# Patient Record
Sex: MALE | Race: WHITE | Employment: OTHER | URBAN - METROPOLITAN AREA
[De-identification: names, ages, dates, MRNs, and addresses within clinical notes are randomized per-mention and may not be internally consistent; named-entity substitution may affect disease eponyms.]

---

## 2017-07-07 ENCOUNTER — OFFICE VISIT (OUTPATIENT)
Dept: NEUROLOGY | Age: 72
End: 2017-07-07

## 2017-07-07 VITALS
SYSTOLIC BLOOD PRESSURE: 156 MMHG | HEIGHT: 64 IN | HEART RATE: 71 BPM | TEMPERATURE: 97.8 F | WEIGHT: 164 LBS | BODY MASS INDEX: 28 KG/M2 | DIASTOLIC BLOOD PRESSURE: 80 MMHG | OXYGEN SATURATION: 97 % | RESPIRATION RATE: 18 BRPM

## 2017-07-07 DIAGNOSIS — G51.0 LEFT-SIDED BELL'S PALSY: Primary | ICD-10-CM

## 2017-07-07 DIAGNOSIS — G25.2 TREMOR, COARSE: ICD-10-CM

## 2017-07-07 DIAGNOSIS — H04.129 EYE DRYNESS: ICD-10-CM

## 2017-07-07 RX ORDER — ERYTHROMYCIN 5 MG/G
OINTMENT OPHTHALMIC
Qty: 1 G | Refills: 1 | Status: SHIPPED | OUTPATIENT
Start: 2017-07-07

## 2017-07-07 RX ORDER — ACYCLOVIR 800 MG/1
800 TABLET ORAL 2 TIMES DAILY
COMMUNITY

## 2017-07-07 RX ORDER — ATORVASTATIN CALCIUM 40 MG/1
TABLET, FILM COATED ORAL DAILY
COMMUNITY

## 2017-07-07 RX ORDER — VALSARTAN 320 MG/1
TABLET ORAL DAILY
COMMUNITY

## 2017-07-07 RX ORDER — METOPROLOL SUCCINATE 25 MG/1
TABLET, EXTENDED RELEASE ORAL DAILY
COMMUNITY

## 2017-07-07 RX ORDER — PREDNISONE 20 MG/1
TABLET ORAL
COMMUNITY
End: 2017-07-07 | Stop reason: SDUPTHER

## 2017-07-07 RX ORDER — HYDROCHLOROTHIAZIDE 12.5 MG/1
12.5 TABLET ORAL DAILY
COMMUNITY

## 2017-07-07 RX ORDER — PREDNISONE 20 MG/1
TABLET ORAL
Qty: 18 TAB | Refills: 0 | Status: SHIPPED | OUTPATIENT
Start: 2017-07-07 | End: 2017-10-10 | Stop reason: ALTCHOICE

## 2017-07-07 NOTE — COMMUNICATION BODY
Chief Complaint   Patient presents with    Facial Droop       Referred by: Dr. Lauri Aase      HPI    Mr. Kaitlin Shrestha is a 69-year-old gentleman with a history of hypertension here for facial weakness. Approximately 8 or 9 days ago he woke up with left facial weakness and asymmetry. He went to urgent care/ED and was diagnosed with Bell's palsy. He had a head CT by report which was normal.  Since then he was given a course of steroids for which he has been taking 60 mg daily for 5 days about to stop tomorrow. He was given a course of acyclovir which he has not begun yet. He has weakness of the left side of his face is that he cannot close his eye. He has a hard time eating and drinking out of the left side of his mouth. He denies any focal weakness or numbness in any of the extremities. No headache. No change in mentation. No prior immunizations or acute illnesses. He also complaining of tremor now for several years more in the left hand. No constipation or drooling other than described due to Bell's. Review of Systems   Neurological: Positive for tremors. Left facial weakness   All other systems reviewed and are negative. Past Medical History:   Diagnosis Date    Anemia     Cancer ([facility] [address]     Cancer of ascending colon ([facility] [address] 9/17/2010    Carcinoma in situ of colon     Chronic cholecystitis     Ringing in ears     SURGERY FOLLOWUP NOS 9/17/2010    Wears glasses      Family History   Problem Relation Age of Onset    Heart Failure Mother     Heart Attack Father      Social History     Social History    Marital status:      Spouse name: N/A    Number of children: N/A    Years of education: N/A     Occupational History    Not on file.      Social History Main Topics    Smoking status: Current Every Day Smoker    Smokeless tobacco: Never Used    Alcohol use Yes    Drug use: Not on file    Sexual activity: Not on file     Other Topics Concern    Not on file Social History Narrative     Current Outpatient Prescriptions   Medication Sig    atorvastatin (LIPITOR) 40 mg tablet Take  by mouth daily.  valsartan (DIOVAN) 320 mg tablet Take  by mouth daily.  hydroCHLOROthiazide (HYDRODIURIL) 12.5 mg tablet Take 12.5 mg by mouth daily.  acyclovir (ZOVIRAX) 800 mg tablet Take 800 mg by mouth two (2) times a day.  metoprolol succinate (TOPROL-XL) 25 mg XL tablet Take  by mouth daily.  erythromycin (ILOTYCIN) ophthalmic ointment Apply 1 ribbon of gel underneath the eyelid up to 4 times daily for lubrication.  predniSONE (DELTASONE) 20 mg tablet Take 2 tabs daily for 5 days then 1 tab daily for 5 days and half tab daily for 5 days then stop    clonidine (CATAPRES) 0.1 mg tablet      No current facility-administered medications for this visit. No Known Allergies      Neurologic Exam     Mental Status   Oriented to person, place, and time. Attention: normal.   Speech: speech is normal   Level of consciousness: alert    Cranial Nerves   Cranial nerves II through XII intact. CN VII   Left facial weakness: peripheral (Injected left eye)    Motor Exam   Muscle bulk: normal  Overall muscle tone: normal    Strength   Strength 5/5 throughout. Sensory Exam   Light touch normal.     Gait, Coordination, and Reflexes     Gait  Gait: normal    Tremor   Resting tremor: Bilateral mild resting tremor more on the left that is amplified with posture. Physical Exam   Constitutional: He is oriented to person, place, and time. He appears well-developed and well-nourished. Cardiovascular: Normal rate. Pulmonary/Chest: Effort normal.   Neurological: He is oriented to person, place, and time. He has normal strength. Gait normal.   Skin: Skin is warm and dry. Psychiatric: He has a normal mood and affect. His speech is normal and behavior is normal.   Vitals reviewed.     Visit Vitals    /80    Pulse 71    Temp 97.8 °F (36.6 °C)    Resp 18    Ht 5' 4\" (1.626 m)    Wt 74.4 kg (164 lb)    SpO2 97%    BMI 28.15 kg/m2       Lab Results  Component Value Date/Time   WBC 12.2 09/04/2010 06:20 AM   HGB 7.8 09/04/2010 06:20 AM   HCT 25.6 09/04/2010 06:20 AM   PLATELET 941 82/62/9710 06:20 AM   MCV 67.7 09/04/2010 06:20 AM     Lab Results  Component Value Date/Time   Hemoglobin A1c 5.9 08/28/2010 11:19 AM   Glucose 115 09/04/2010 06:20 AM   LDL, calculated 106 08/29/2010 05:45 AM   Creatinine 1.2 09/04/2010 06:20 AM      Lab Results  Component Value Date/Time   Cholesterol, total 178 08/29/2010 05:45 AM   HDL Cholesterol 33 08/29/2010 05:45 AM   LDL, calculated 106 08/29/2010 05:45 AM   Triglyceride 195 08/29/2010 05:45 AM   CHOL/HDL Ratio 5.4 08/29/2010 05:45 AM              CT Results (maximum last 3): Results from [place] encounter on 09/21/10   CT PELVIS WITH CONTRAST   Narrative Final Report      ICD Codes / Adm. Diagnosis:    /   COLON CANCER  Examination:  PELVIS W CON  - [id number] - Sep 21 2010  1:28PM  Accession No:  [id number]  Reason:  COLON CANCER      REPORT:  Multiple axial images were performed through the abdomen with and without IV   contrast and through the pelvis with IV contrast. 95 mL of Optiray-350 was   administered intravenously. No focal lesions are identified in the liver,   spleen pancreas or kidneys. Right colectomy changes are noted. No adenopathy   is identified. Images in the pelvis demonstrates no mass lesions. There is   slight haziness around umbilicus most likely postoperative. IMPRESSION:  1. No focal lesions are identified in the liver, spleen pancreas or kidneys. 2. Postoperative changes are noted from right colectomy. 3. No adenopathy is identified. No pelvic masses are identified.           Interpreting/Reading Doctor: Rashmi Mills ([id number])  Transcribed: n/a on 09/21/2010  Approved: Rashmi Mills ([id number])  09/21/2010          Distribution:  Attending Doctor: Julissa Osborne Doctor: Ama Pope CLARKE          CT ABDOMEN W AND W/O CONTRAST   Narrative Final Report      ICD Codes / Adm. Diagnosis:    /   COLON CANCER  Examination:  ABDOMEN W AND WO CON  - [id number] - Sep 21 2010  1:28PM  Accession No:  [id number]  Reason:  COLON CANCER      REPORT:  Multiple axial images were performed through the abdomen with and without IV   contrast and through the pelvis with IV contrast. 95 mL of Optiray-350 was   administered intravenously. No focal lesions are identified in the liver,   spleen pancreas or kidneys. Right colectomy changes are noted. No adenopathy   is identified. Images in the pelvis demonstrates no mass lesions. There is   slight haziness around umbilicus most likely postoperative. IMPRESSION:  1. No focal lesions are identified in the liver, spleen pancreas or kidneys. 2. Postoperative changes are noted from right colectomy. 3. No adenopathy is identified. No pelvic masses are identified. Interpreting/Reading Doctor: Lars Alcaraz ([id number])  Transcribed: n/a on 09/21/2010  Approved: Lars Alcaraz ([id number])  09/21/2010          Distribution:  Attending Doctor: Ed Coleman  Alternate Doctor: Ed Coleman            MRI Results (maximum last 3): No results found for this or any previous visit. PET Results (maximum last 3): No results found for this or any previous visit. Assessment and Plan   Devin Hawthorne was seen today for facial droop. Diagnoses and all orders for this visit:    Left-sided Bell's palsy    Eye dryness    Tremor, coarse    Other orders  -     erythromycin (ILOTYCIN) ophthalmic ointment; Apply 1 ribbon of gel underneath the eyelid up to 4 times daily for lubrication. -     predniSONE (DELTASONE) 20 mg tablet;  Take 2 tabs daily for 5 days then 1 tab daily for 5 days and half tab daily for 5 days then stop      78-year-old gentleman with left Bell's palsy I discussed with him that 80% of people have complete recovery but there is a small population inwhich there may be residual deficit. Unclear how much he will recover at this point. I am going to prolong his steroid taper. To include 40 then 20 than 10 mg dosing. I advised him to not take acyclovir as there is no role for antivirals in this condition. I discussed with him the most serious complication of this is corneal abrasions which could lead to permanent vision loss. I am giving him erythromycin ophthalmic ointment to use as needed. He should use an eye patch or tape the eye at bedtime so it is closed. Additionally, he is having tremor more on the left hand but present in both hands. This looks benign. No rigidity on exam.  No telltale signs for parkinsonism being reported. I would watch for now. He is about to start metoprolol for hypertension which could help the tremor slightly. I would like to see him in 3 months for the tremor. A notice of this visit/encounter being completed has been sent electronically to the patient's PCP and/or referring provider.      [address] [facility], [facility] [address]  Diplomate MIGDALIA

## 2017-07-07 NOTE — LETTER
7/7/2017 Patient:  Nano Chen YOB: 1945 Date of Visit: 7/7/2017 Dear Saira Knowles MD 
[facility] [address] [facility] [phone] [id number] VIA Facsimile: [phone] 
 : 
 
 
I was requested by Sugar Armas MD to evaluate Mr. Tee Neville  for Chief Complaint Patient presents with  Facial Droop Michaelyn Daily I am recommending the following:  
 
Douglas Whalen was seen today for facial droop. Diagnoses and all orders for this visit: 
 
Left-sided Bell's palsy Eye dryness Tremor, coarse Other orders 
-     erythromycin (ILOTYCIN) ophthalmic ointment; Apply 1 ribbon of gel underneath the eyelid up to 4 times daily for lubrication. -     predniSONE (DELTASONE) 20 mg tablet; Take 2 tabs daily for 5 days then 1 tab daily for 5 days and half tab daily for 5 days then stop 
 
 
 
---------------------------------------------------------------------------------------------------------------------- Below is my encounter: Chief Complaint Patient presents with  Facial Droop Referred by: Dr. Caroline Bolden HPI Mr. Justine Wu is a 78-year-old gentleman with a history of hypertension here for facial weakness. Approximately 8 or 9 days ago he woke up with left facial weakness and asymmetry. He went to urgent care/ED and was diagnosed with Bell's palsy. He had a head CT by report which was normal.  Since then he was given a course of steroids for which he has been taking 60 mg daily for 5 days about to stop tomorrow. He was given a course of acyclovir which he has not begun yet. He has weakness of the left side of his face is that he cannot close his eye. He has a hard time eating and drinking out of the left side of his mouth. He denies any focal weakness or numbness in any of the extremities. No headache. No change in mentation. No prior immunizations or acute illnesses. He also complaining of tremor now for several years more in the left hand. No constipation or drooling other than described due to Bell's. Review of Systems Neurological: Positive for tremors. Left facial weakness All other systems reviewed and are negative. Past Medical History:  
Diagnosis Date  Anemia  Cancer ([facility] [address]  Cancer of ascending colon ([facility] [address] 9/17/2010  Carcinoma in situ of colon  Chronic cholecystitis  Ringing in ears  SURGERY FOLLOWUP NOS 9/17/2010  Wears glasses Family History Problem Relation Age of Onset  Heart Failure Mother  Heart Attack Father Social History Social History  Marital status:  Spouse name: N/A  
 Number of children: N/A  
 Years of education: N/A Occupational History  Not on file. Social History Main Topics  Smoking status: Current Every Day Smoker  Smokeless tobacco: Never Used  Alcohol use Yes  Drug use: Not on file  Sexual activity: Not on file Other Topics Concern  Not on file Social History Narrative Current Outpatient Prescriptions Medication Sig  
 atorvastatin (LIPITOR) 40 mg tablet Take  by mouth daily.  valsartan (DIOVAN) 320 mg tablet Take  by mouth daily.  hydroCHLOROthiazide (HYDRODIURIL) 12.5 mg tablet Take 12.5 mg by mouth daily.  acyclovir (ZOVIRAX) 800 mg tablet Take 800 mg by mouth two (2) times a day.  metoprolol succinate (TOPROL-XL) 25 mg XL tablet Take  by mouth daily.  erythromycin (ILOTYCIN) ophthalmic ointment Apply 1 ribbon of gel underneath the eyelid up to 4 times daily for lubrication.  predniSONE (DELTASONE) 20 mg tablet Take 2 tabs daily for 5 days then 1 tab daily for 5 days and half tab daily for 5 days then stop  clonidine (CATAPRES) 0.1 mg tablet No current facility-administered medications for this visit. No Known Allergies Neurologic Exam  
 
Mental Status Oriented to person, place, and time. Attention: normal.  
Speech: speech is normal  
Level of consciousness: alert Cranial Nerves Cranial nerves II through XII intact. CN VII Left facial weakness: peripheral (Injected left eye) Motor Exam  
Muscle bulk: normal 
Overall muscle tone: normal 
 
Strength Strength 5/5 throughout. Sensory Exam  
Light touch normal.  
 
Gait, Coordination, and Reflexes Gait Gait: normal 
 
Tremor Resting tremor: Bilateral mild resting tremor more on the left that is amplified with posture. Physical Exam  
Constitutional: He is oriented to person, place, and time. He appears well-developed and well-nourished. Cardiovascular: Normal rate. Pulmonary/Chest: Effort normal.  
Neurological: He is oriented to person, place, and time. He has normal strength. Gait normal.  
Skin: Skin is warm and dry. Psychiatric: He has a normal mood and affect. His speech is normal and behavior is normal.  
Vitals reviewed. Visit Vitals  /80  Pulse 71  Temp 97.8 °F (36.6 °C)  Resp 18  Ht 5' 4\" (1.626 m)  Wt 74.4 kg (164 lb)  SpO2 97%  BMI 28.15 kg/m2 Lab Results Component Value Date/Time WBC 12.2 09/04/2010 06:20 AM  
HGB 7.8 09/04/2010 06:20 AM  
HCT 25.6 09/04/2010 06:20 AM  
PLATELET 915 54/17/3205 06:20 AM  
MCV 67.7 09/04/2010 06:20 AM  
 
Lab Results Component Value Date/Time Hemoglobin A1c 5.9 08/28/2010 11:19 AM  
Glucose 115 09/04/2010 06:20 AM  
LDL, calculated 106 08/29/2010 05:45 AM  
Creatinine 1.2 09/04/2010 06:20 AM  
  
Lab Results Component Value Date/Time Cholesterol, total 178 08/29/2010 05:45 AM  
HDL Cholesterol 33 08/29/2010 05:45 AM  
LDL, calculated 106 08/29/2010 05:45 AM  
Triglyceride 195 08/29/2010 05:45 AM  
CHOL/HDL Ratio 5.4 08/29/2010 05:45 AM  
 
 
 
  
 
[facility] (maximum last 3): [facility] PEL[facility] WITH CONTRAST Narrative Final Report ICD Codes / Adm. Diagnosis:    /   COLON CANCER Examination:  PELVIS W CON  - [id number] - Sep 21 2010  1:28PM 
Accession No:  [id number] Reason:  COLON CANCER 
 
 
REPORT: 
Multiple axial images were performed through the abdomen with and without IV  
contrast and through the pelvis with IV contrast. 95 mL of Optiray-350 was  
administered intravenously. No focal lesions are identified in the liver,  
spleen pancreas or kidneys. Right colectomy changes are noted. No adenopathy  
is identified. Images in the pelvis demonstrates no mass lesions. There is  
slight haziness around umbilicus most likely postoperative. IMPRESSION: 
1. No focal lesions are identified in the liver, spleen pancreas or kidneys. 2. Postoperative changes are noted from right colectomy. 3. No adenopathy is identified. No pelvic masses are identified. Interpreting/Reading Doctor: Brent Daigle ([id number]) Transcribed: n/a on 09/21/2010 [facility] ([id number])  09/21/2010 Distribution:  Attending Doctor: Nate Crane Alternate Doctor: Nate Crane 
 
   
 
CT ABDOMEN W AND W/O CONTRAST Narrative Final Report ICD Codes / Adm. Diagnosis:    /   COLON CANCER Examination:  ABDOMEN W AND WO CON  - [id number] - Sep 21 2010  1:28PM 
Accession No:  [id number] Reason:  COLON CANCER 
 
 
REPORT: 
Multiple axial images were performed through the abdomen with and without IV  
contrast and through the pelvis with IV contrast. 95 mL of Optiray-350 was  
administered intravenously. No focal lesions are identified in the liver,  
spleen pancreas or kidneys. Right colectomy changes are noted. No adenopathy  
is identified. Images in the pelvis demonstrates no mass lesions. There is  
slight haziness around umbilicus most likely postoperative. IMPRESSION: 
1. No focal lesions are identified in the liver, spleen pancreas or kidneys. 2. Postoperative changes are noted from right colectomy. 3. No adenopathy is identified. No pelvic masses are identified. Interpreting/Reading Doctor: Rashmi Mills ([id number]) Transcribed: n/a on 09/21/2010 Danitza Henning ([id number])  09/21/2010 Distribution:  Attending Doctor: Mike Luis Alternate Doctor: Mike Luis Assessment and Plan Left-sided Bell's palsy Eye dryness Tremor, coarse Other orders 
-     erythromycin (ILOTYCIN) ophthalmic ointment; Apply 1 ribbon of gel underneath the eyelid up to 4 times daily for lubrication. -     predniSONE (DELTASONE) 20 mg tablet; Take 2 tabs daily for 5 days then 1 tab daily for 5 days and half tab daily for 5 days then stop 68-year-old gentleman with left Bell's palsy I discussed with him that 80% of people have complete recovery but there is a small population inwhich there may be residual deficit. Unclear how much he will recover at this point. I am going to prolong his steroid taper. To include 40 then 20 than 10 mg dosing. I advised him to not take acyclovir as there is no role for antivirals in this condition. I discussed with him the most serious complication of this is corneal abrasions which could lead to permanent vision loss. I am giving him erythromycin ophthalmic ointment to use as needed. He should use an eye patch or tape the eye at bedtime so it is closed. Additionally, he is having tremor more on the left hand but present in both hands. This looks benign. No rigidity on exam.  No telltale signs for parkinsonism being reported. I would watch for now. He is about to start metoprolol for hypertension which could help the tremor slightly. I would like to see him in 3 months for the tremor. Thank you for giving me the opportunity to assist in the care of Mr. Byron Butler. If you have questions, please do not hesitate to contact me. Sincerely, [address] [facility], DO Neurologist 
Diplomate ABPN

## 2017-07-07 NOTE — MR AVS SNAPSHOT
Visit Information Date & Time Provider Department Dept. Phone Encounter #  
 7/7/2017  1:00  [facility], [address] Neurology Clinic at [address] [id number] Follow-up Instructions Return in about 3 months (around 10/7/2017). Upcoming Health Maintenance Date Due DTaP/Tdap/Td series (1 - Tdap) 11/22/1965 FOBT Q 1 YEAR AGE 50-75 11/22/1994 ZOSTER VACCINE AGE 60> 11/22/2004 GLAUCOMA SCREENING Q2Y 11/22/2009 Pneumococcal 65+ High/Highest Risk (1 of 2 - PCV13) 11/22/2009 MEDICARE YEARLY EXAM 11/22/2009 INFLUENZA AGE 9 TO ADULT 8/1/2017 Allergies as of 7/7/2017  Review Complete On: 7/7/2017 By: Gely Whitlock LPN No Known Allergies Current Immunizations  Never Reviewed No immunizations on file. Not reviewed this visit You Were Diagnosed With   
  
 Codes Comments Left-sided Bell's palsy    -  Primary ICD-10-CM: G51.0 ICD-9-CM: 351.0 Eye dryness     ICD-10-CM: L16.145 ICD-9-CM: 375.15 Tremor, coarse     ICD-10-CM: G25.2 ICD-9-CM: 529. 0 Vitals BP Pulse Temp Resp Height(growth percentile) Weight(growth percentile) 156/80 71 97.8 °F (36.6 °C) 18 5' 4\" (1.626 m) 164 lb (74.4 kg) SpO2 BMI Smoking Status 97% 28.15 kg/m2 Current Every Day Smoker BMI and BSA Data Body Mass Index Body Surface Area  
 28.15 kg/m 2 1.83 m 2 Your Updated Medication List  
  
   
This list is accurate as of: 7/7/17  1:49 PM.  Always use your most recent med list.  
  
  
  
  
 acyclovir 800 mg tablet Commonly known as:  ZOVIRAX Take 800 mg by mouth two (2) times a day. atorvastatin 40 mg tablet Commonly known as:  LIPITOR Take  by mouth daily. cloNIDine HCl 0.1 mg tablet Commonly known as:  CATAPRES  
  
 erythromycin ophthalmic ointment Commonly known as:  ILOTYCIN Apply 1 ribbon of gel underneath the eyelid up to 4 times daily for lubrication. hydroCHLOROthiazide 12.5 mg tablet Commonly known as:  HYDRODIURIL Take 12.5 mg by mouth daily. metoprolol succinate 25 mg XL tablet Commonly known as:  TOPROL-XL Take  by mouth daily. predniSONE 20 mg tablet Commonly known as:  Zeus Smaller Take 2 tabs daily for 5 days then 1 tab daily for 5 days and half tab daily for 5 days then stop  
  
 valsartan 320 mg tablet Commonly known as:  DIOVAN Take  by mouth daily. Prescriptions Printed Refills  
 erythromycin (ILOTYCIN) ophthalmic ointment 1 Sig: Apply 1 ribbon of gel underneath the eyelid up to 4 times daily for lubrication. Class: Print  
 predniSONE (DELTASONE) 20 mg tablet 0 Sig: Take 2 tabs daily for 5 days then 1 tab daily for 5 days and half tab daily for 5 days then stop Class: Print Follow-up Instructions Return in about 3 months (around 10/7/2017). Patient Instructions PRESCRIPTION REFILL POLICY [facility] Statement to Patients April 1, 2014 In an effort to ensure the large volume of patient prescription refills is processed in the most efficient and expeditious manner, we are asking our patients to assist us by calling your Pharmacy for all prescription refills, this will include also your  Mail Order Pharmacy. The pharmacy will contact our office electronically to continue the refill process. Please do not wait until the last minute to call your pharmacy. We need at least 48 hours (2days) to fill prescriptions. We also encourage you to call your pharmacy before going to  your prescription to make sure it is ready. With regard to controlled substance prescription refill requests (narcotic refills) that need to be picked up at our office, we ask your cooperation by providing us with at least 72 hours (3days) notice that you will need a refill.  
 
We will not refill narcotic prescription refill requests after 4:00pm on any weekday, Monday through Thursday, or after 2:00pm on Fridays, or on the weekends. We encourage everyone to explore another way of getting your prescription refill request processed using [employer], our patient web portal through our electronic medical record system. [employer] is an efficient and effective way to communicate your medication request directly to the office and  downloadable as an wolf on your smart phone . [employer] also features a review functionality that allows you to view your medication list as well as leave messages for your physician. Are you ready to get connected? If so please review the attatched instructions or speak to any of our staff to get you set up right away! Thank you so much for your cooperation. Should you have any questions please contact our Practice Administrator. The Physicians and Staff,  [facility] Thank you for choosing [facility] for your  
 
care. You may receive a survey about your visit. We appreciate you taking time  
 
to complete this survey as we use your feedback to improve our services. We  
 
realize we are not perfect, but we strive to provide excellent care. Introducing [facility] & HEALTH SERVICES! [facility] introduces [employer] patient portal. Now you can access parts of your medical record, email your doctor's office, and request medication refills online. 1. In your internet browser, go to [email or web address] 2. Click on the First Time User? Click Here link in the Sign In box. You will see the New Member Sign Up page. 3. Enter your [employer] Access Code exactly as it appears below. You will not need to use this code after youve completed the sign-up process. If you do not sign up before the expiration date, you must request a new code. · [employer] Access Code: [email or web address] Expires: 10/5/2017  1:18 PM 
 
 4. Enter the last four digits of your Social Security Number (xxxx) and Date of Birth (mm/dd/yyyy) as indicated and click Submit. You will be taken to the next sign-up page. 5. Create a Genlot ID. This will be your [employer] login ID and cannot be changed, so think of one that is secure and easy to remember. 6. Create a [employer] password. You can change your password at any time. 7. Enter your Password Reset Question and Answer. This can be used at a later time if you forget your password. 8. Enter your e-mail address. You will receive e-mail notification when new information is available in [address] 9. Click Sign Up. You can now view and download portions of your medical record. 10. Click the Download Summary menu link to download a portable copy of your medical information. If you have questions, please visit the Frequently Asked Questions section of the [employer] website. Remember, [employer] is NOT to be used for urgent needs. For medical emergencies, dial 911. Now available from your iPhone and Android! Please provide this summary of care documentation to your next provider. Your primary care clinician is listed as BARRON QUICK. If you have any questions after today's visit, please call [phone].

## 2017-07-07 NOTE — PATIENT INSTRUCTIONS
[facility]   Statement to Patients  April 1, 2014      In an effort to ensure the large volume of patient prescription refills is processed in the most efficient and expeditious manner, we are asking our patients to assist us by calling your Pharmacy for all prescription refills, this will include also your  Mail Order Pharmacy. The pharmacy will contact our office electronically to continue the refill process. Please do not wait until the last minute to call your pharmacy. We need at least 48 hours (2days) to fill prescriptions. We also encourage you to call your pharmacy before going to  your prescription to make sure it is ready. With regard to controlled substance prescription refill requests (narcotic refills) that need to be picked up at our office, we ask your cooperation by providing us with at least 72 hours (3days) notice that you will need a refill. We will not refill narcotic prescription refill requests after 4:00pm on any weekday, Monday through Thursday, or after 2:00pm on Fridays, or on the weekends. We encourage everyone to explore another way of getting your prescription refill request processed using [employer], our patient web portal through our electronic medical record system. [employer] is an efficient and effective way to communicate your medication request directly to the office and  downloadable as an wolf on your smart phone . [employer] also features a review functionality that allows you to view your medication list as well as leave messages for your physician. Are you ready to get connected? If so please review the attatched instructions or speak to any of our staff to get you set up right away! Thank you so much for your cooperation. Should you have any questions please contact our Practice Administrator.     The Physicians and Staff,  [facility]     Thank you for choosing [facility] and [facility] for your     care. You may receive a survey about your visit. We appreciate you taking time     to complete this survey as we use your feedback to improve our services. We     realize we are not perfect, but we strive to provide excellent care.

## 2017-07-07 NOTE — PROGRESS NOTES
Chief Complaint   Patient presents with    Facial Droop       Referred by: Dr. Monica Mccormack      HPI    Mr. Rusty Monique is a 59-year-old gentleman with a history of hypertension here for facial weakness. Approximately 8 or 9 days ago he woke up with left facial weakness and asymmetry. He went to urgent care/ED and was diagnosed with Bell's palsy. He had a head CT by report which was normal.  Since then he was given a course of steroids for which he has been taking 60 mg daily for 5 days about to stop tomorrow. He was given a course of acyclovir which he has not begun yet. He has weakness of the left side of his face is that he cannot close his eye. He has a hard time eating and drinking out of the left side of his mouth. He denies any focal weakness or numbness in any of the extremities. No headache. No change in mentation. No prior immunizations or acute illnesses. He also complaining of tremor now for several years more in the left hand. No constipation or drooling other than described due to Bell's. Review of Systems   Neurological: Positive for tremors. Left facial weakness   All other systems reviewed and are negative. Past Medical History:   Diagnosis Date    Anemia     [facility]     Cancer of ascending colon ([facility] 9/17/2010    Carcinoma in situ of colon     Chronic cholecystitis     Ringing in ears     SURGERY FOLLOWUP NOS 9/17/2010    Wears glasses      Family History   Problem Relation Age of Onset    Heart Failure Mother     Heart Attack Father      Social History     Social History    Marital status:      Spouse name: N/A    Number of children: N/A    Years of education: N/A     Occupational History    Not on file.      Social History Main Topics    Smoking status: Current Every Day Smoker    Smokeless tobacco: Never Used    Alcohol use Yes    Drug use: Not on file    Sexual activity: Not on file     Other Topics Concern    Not on file Social History Narrative     Current Outpatient Prescriptions   Medication Sig    atorvastatin (LIPITOR) 40 mg tablet Take  by mouth daily.  valsartan (DIOVAN) 320 mg tablet Take  by mouth daily.  hydroCHLOROthiazide (HYDRODIURIL) 12.5 mg tablet Take 12.5 mg by mouth daily.  acyclovir (ZOVIRAX) 800 mg tablet Take 800 mg by mouth two (2) times a day.  metoprolol succinate (TOPROL-XL) 25 mg XL tablet Take  by mouth daily.  erythromycin (ILOTYCIN) ophthalmic ointment Apply 1 ribbon of gel underneath the eyelid up to 4 times daily for lubrication.  predniSONE (DELTASONE) 20 mg tablet Take 2 tabs daily for 5 days then 1 tab daily for 5 days and half tab daily for 5 days then stop    clonidine (CATAPRES) 0.1 mg tablet      No current facility-administered medications for this visit. No Known Allergies      Neurologic Exam     Mental Status   Oriented to person, place, and time. Attention: normal.   Speech: speech is normal   Level of consciousness: alert    Cranial Nerves   Cranial nerves II through XII intact. CN VII   Left facial weakness: peripheral (Injected left eye)    Motor Exam   Muscle bulk: normal  Overall muscle tone: normal    Strength   Strength 5/5 throughout. Sensory Exam   Light touch normal.     Gait, Coordination, and Reflexes     Gait  Gait: normal    Tremor   Resting tremor: Bilateral mild resting tremor more on the left that is amplified with posture. Physical Exam   Constitutional: He is oriented to person, place, and time. He appears well-developed and well-nourished. Cardiovascular: Normal rate. Pulmonary/Chest: Effort normal.   Neurological: He is oriented to person, place, and time. He has normal strength. Gait normal.   Skin: Skin is warm and dry. Psychiatric: He has a normal mood and affect. His speech is normal and behavior is normal.   Vitals reviewed.     Visit Vitals    /80    Pulse 71    Temp 97.8 °F (36.6 °C)    Resp 18    Ht 5' 4\" (1.626 m)    Wt 74.4 kg (164 lb)    SpO2 97%    BMI 28.15 kg/m2       Lab Results  Component Value Date/Time   WBC 12.2 09/04/2010 06:20 AM   HGB 7.8 09/04/2010 06:20 AM   HCT 25.6 09/04/2010 06:20 AM   PLATELET 117 04/32/3593 06:20 AM   MCV 67.7 09/04/2010 06:20 AM     Lab Results  Component Value Date/Time   Hemoglobin A1c 5.9 08/28/2010 11:19 AM   Glucose 115 09/04/2010 06:20 AM   LDL, calculated 106 08/29/2010 05:45 AM   Creatinine 1.2 09/04/2010 06:20 AM      Lab Results  Component Value Date/Time   Cholesterol, total 178 08/29/2010 05:45 AM   HDL Cholesterol 33 08/29/2010 05:45 AM   LDL, calculated 106 08/29/2010 05:45 AM   Triglyceride 195 08/29/2010 05:45 AM   CHOL/HDL Ratio 5.4 08/29/2010 05:45 AM              CT Results (maximum last 3): Results from [place] encounter on 09/21/10   CT PELVIS WITH CONTRAST   Narrative Final Report      ICD Codes / Adm. Diagnosis:    /   COLON CANCER  Examination:  PELVIS W CON  - [id number] - Sep 21 2010  1:28PM  Accession No:  [id number]  Reason:  COLON CANCER      REPORT:  Multiple axial images were performed through the abdomen with and without IV   contrast and through the pelvis with IV contrast. 95 mL of Optiray-350 was   administered intravenously. No focal lesions are identified in the liver,   spleen pancreas or kidneys. Right colectomy changes are noted. No adenopathy   is identified. Images in the pelvis demonstrates no mass lesions. There is   slight haziness around umbilicus most likely postoperative. IMPRESSION:  1. No focal lesions are identified in the liver, spleen pancreas or kidneys. 2. Postoperative changes are noted from right colectomy. 3. No adenopathy is identified. No pelvic masses are identified.           Interpreting/Reading Doctor: Leone Leyden ([id number])  Transcribed: n/a on 09/21/2010  Approved: Leone Leyden ([id number])  09/21/2010          Distribution:  Attending Doctor: Nav Arshad Doctor: Lidia Villasenor CLARKE          CT ABDOMEN W AND W/O CONTRAST   Narrative Final Report      ICD Codes / Adm. Diagnosis:    /   COLON CANCER  Examination:  ABDOMEN W AND WO CON  - [id number] - Sep 21 2010  1:28PM  Accession No:  [id number]  Reason:  COLON CANCER      REPORT:  Multiple axial images were performed through the abdomen with and without IV   contrast and through the pelvis with IV contrast. 95 mL of Optiray-350 was   administered intravenously. No focal lesions are identified in the liver,   spleen pancreas or kidneys. Right colectomy changes are noted. No adenopathy   is identified. Images in the pelvis demonstrates no mass lesions. There is   slight haziness around umbilicus most likely postoperative. IMPRESSION:  1. No focal lesions are identified in the liver, spleen pancreas or kidneys. 2. Postoperative changes are noted from right colectomy. 3. No adenopathy is identified. No pelvic masses are identified. Interpreting/Reading Doctor: Andres Bain ([id number])  Transcribed: n/a on 09/21/2010  Approved: Andres Bain ([id number])  09/21/2010          Distribution:  Attending Doctor: Yasmine Solis  Alternate Doctor: Yasmine Solis            MRI Results (maximum last 3): No results found for this or any previous visit. PET Results (maximum last 3): No results found for this or any previous visit. Assessment and Plan   Lisa Chaves was seen today for facial droop. Diagnoses and all orders for this visit:    Left-sided Bell's palsy    Eye dryness    Tremor, coarse    Other orders  -     erythromycin (ILOTYCIN) ophthalmic ointment; Apply 1 ribbon of gel underneath the eyelid up to 4 times daily for lubrication. -     predniSONE (DELTASONE) 20 mg tablet;  Take 2 tabs daily for 5 days then 1 tab daily for 5 days and half tab daily for 5 days then stop      68-year-old gentleman with left Bell's palsy I discussed with him that 80% of people have complete recovery but there is a small population inwhich there may be residual deficit. Unclear how much he will recover at this point. I am going to prolong his steroid taper. To include 40 then 20 than 10 mg dosing. I advised him to not take acyclovir as there is no role for antivirals in this condition. I discussed with him the most serious complication of this is corneal abrasions which could lead to permanent vision loss. I am giving him erythromycin ophthalmic ointment to use as needed. He should use an eye patch or tape the eye at bedtime so it is closed. Additionally, he is having tremor more on the left hand but present in both hands. This looks benign. No rigidity on exam.  No telltale signs for parkinsonism being reported. I would watch for now. He is about to start metoprolol for hypertension which could help the tremor slightly. I would like to see him in 3 months for the tremor. A notice of this visit/encounter being completed has been sent electronically to the patient's PCP and/or referring provider.      Jonah Santiago, [address]  Diplomate ABPN

## 2017-10-10 ENCOUNTER — OFFICE VISIT (OUTPATIENT)
Dept: NEUROLOGY | Age: 72
End: 2017-10-10

## 2017-10-10 VITALS
HEART RATE: 62 BPM | WEIGHT: 181.2 LBS | HEIGHT: 64 IN | RESPIRATION RATE: 16 BRPM | OXYGEN SATURATION: 97 % | BODY MASS INDEX: 30.93 KG/M2 | SYSTOLIC BLOOD PRESSURE: 140 MMHG | DIASTOLIC BLOOD PRESSURE: 70 MMHG

## 2017-10-10 DIAGNOSIS — G25.2 TREMOR, COARSE: ICD-10-CM

## 2017-10-10 DIAGNOSIS — G51.0 LEFT-SIDED BELL'S PALSY: Primary | ICD-10-CM

## 2017-10-10 RX ORDER — VALSARTAN AND HYDROCHLOROTHIAZIDE 320; 12.5 MG/1; MG/1
TABLET, FILM COATED ORAL
Refills: 3 | COMMUNITY
Start: 2017-09-09

## 2017-10-10 NOTE — MR AVS SNAPSHOT
Visit Information Date & Time Provider Department Dept. Phone Encounter #  
 10/10/2017  1:40 PM Aniyafish Cho Trent [facility], [facility] at [address] [id number] Follow-up Instructions Return if symptoms worsen or fail to improve. Upcoming Health Maintenance Date Due DTaP/Tdap/Td series (1 - Tdap) 11/22/1965 FOBT Q 1 YEAR AGE 50-75 11/22/1994 ZOSTER VACCINE AGE 60> 9/22/2004 GLAUCOMA SCREENING Q2Y 11/22/2009 Pneumococcal 65+ High/Highest Risk (1 of 2 - PCV13) 11/22/2009 MEDICARE YEARLY EXAM 11/22/2009 INFLUENZA AGE 9 TO ADULT 8/1/2017 Allergies as of 10/10/2017  Review Complete On: 10/10/2017 By: Hernandez Birch LPN No Known Allergies Current Immunizations  Never Reviewed No immunizations on file. Not reviewed this visit You Were Diagnosed With   
  
 Codes Comments Left-sided Bell's palsy    -  Primary ICD-10-CM: G51.0 ICD-9-CM: 351.0 Tremor, coarse     ICD-10-CM: G25.2 ICD-9-CM: 921. 0 Vitals BP Pulse Resp Height(growth percentile) Weight(growth percentile) SpO2  
 140/70 62 16 5' 4\" (1.626 m) 181 lb 3.2 oz (82.2 kg) 97% BMI Smoking Status 31.1 kg/m2 Current Every Day Smoker Vitals History BMI and BSA Data Body Mass Index Body Surface Area  
 31.1 kg/m 2 1.93 m 2 Your Updated Medication List  
  
   
This list is accurate as of: 10/10/17  1:56 PM.  Always use your most recent med list.  
  
  
  
  
 acyclovir 800 mg tablet Commonly known as:  ZOVIRAX Take 800 mg by mouth two (2) times a day. atorvastatin 40 mg tablet Commonly known as:  LIPITOR Take  by mouth daily. cloNIDine HCl 0.1 mg tablet Commonly known as:  CATAPRES  
  
 erythromycin ophthalmic ointment Commonly known as:  ILOTYCIN Apply 1 ribbon of gel underneath the eyelid up to 4 times daily for lubrication. hydroCHLOROthiazide 12.5 mg tablet Commonly known as:  HYDRODIURIL Take 12.5 mg by mouth daily. metoprolol succinate 25 mg XL tablet Commonly known as:  TOPROL-XL Take  by mouth daily. valsartan 320 mg tablet Commonly known as:  DIOVAN Take  by mouth daily. valsartan-hydroCHLOROthiazide 320-12.5 mg per tablet Commonly known as:  DIOVAN-HCT  
TAKE 1 TABLET BY MOUTH EVERY DAY Follow-up Instructions Return if symptoms worsen or fail to improve. Introducing [facility] & HEALTH SERVICES! [facility] introduces [employer] patient portal. Now you can access parts of your medical record, email your doctor's office, and request medication refills online. 1. In your internet browser, go to [email or web address] 2. Click on the First Time User? Click Here link in the Sign In box. You will see the New Member Sign Up page. 3. Enter your [employer] Access Code exactly as it appears below. You will not need to use this code after youve completed the sign-up process. If you do not sign up before the expiration date, you must request a new code. · [employer] Access Code: [facility] Expires: 1/8/2018  1:37 PM 
 
4. Enter the last four digits of your Social Security Number (xxxx) and Date of Birth (mm/dd/yyyy) as indicated and click Submit. You will be taken to the next sign-up page. 5. Create a [employer] ID. This will be your [employer] login ID and cannot be changed, so think of one that is secure and easy to remember. 6. Create a [employer] password. You can change your password at any time. 7. Enter your Password Reset Question and Answer. This can be used at a later time if you forget your password. 8. Enter your e-mail address. You will receive e-mail notification when new information is available in [address] 9. Click Sign Up. You can now view and download portions of your medical record. 10. Click the Download Summary menu link to download a portable copy of your medical information. If you have questions, please visit the Frequently Asked Questions section of the MyChart website. Remember, [employer] is NOT to be used for urgent needs. For medical emergencies, dial 911. Now available from your iPhone and Android! Please provide this summary of care documentation to your next provider. Your primary care clinician is listed as Dave John. If you have any questions after today's visit, please call [phone].

## 2017-10-10 NOTE — LETTER
10/10/2017 Patient:  Ayla Jackson YOB: 1945 Date of Visit: 10/10/2017 Dear Isabela Longo MD 
[address] [facility] [place] [id number] VIA Facsimile: [phone] 
 : 
 
 
I was requested by Isabela Longo MD to evaluate Mr. Kristal Brown  for Chief Complaint Patient presents with  Facial Droop  
  f/u Dimitrios Clark I am recommending the following:  
 
Diagnoses and all orders for this visit: 1. Left-sided Bell's palsy 2. Tremor, coarse 
 
 
 
---------------------------------------------------------------------------------------------------------------------- Below is my encounter: Chief Complaint Patient presents with  Facial Droop f/u HUGO Deng is a 62-year-old gentleman whom I saw on July 9 for left Bell's palsy. I placed him on a steroid pack. He has had good improvement with mild deficit currently. He is also still suffering the same tremor he had in July. No change of metoprolol. He is able to close his eye at bedtime now. Still some dryness. Review of Systems Eyes:  
     Dry eye Neurological: Positive for tremors. All other systems reviewed and are negative. Past Medical History:  
Diagnosis Date  Anemia  Cancer ([facility] [address]  Cancer of ascending colon ([facility] [address] 9/17/2010  Carcinoma in situ of colon  Chronic cholecystitis  Ringing in ears  SURGERY FOLLOWUP NOS 9/17/2010  Wears glasses Family History Problem Relation Age of Onset  Heart Failure Mother  Heart Attack Father Social History Social History  Marital status:  Spouse name: N/A  
 Number of children: N/A  
 Years of education: N/A Occupational History  Not on file. Social History Main Topics  Smoking status: Current Every Day Smoker  Smokeless tobacco: Never Used  Alcohol use Yes  Drug use: Not on file  Sexual activity: Not on file Other Topics Concern  Not on file Social History Narrative No Known Allergies Current Outpatient Prescriptions Medication Sig  
 atorvastatin (LIPITOR) 40 mg tablet Take  by mouth daily.  valsartan (DIOVAN) 320 mg tablet Take  by mouth daily.  hydroCHLOROthiazide (HYDRODIURIL) 12.5 mg tablet Take 12.5 mg by mouth daily.  metoprolol succinate (TOPROL-XL) 25 mg XL tablet Take  by mouth daily.  erythromycin (ILOTYCIN) ophthalmic ointment Apply 1 ribbon of gel underneath the eyelid up to 4 times daily for lubrication.  valsartan-hydroCHLOROthiazide (DIOVAN-HCT) 320-12.5 mg per tablet TAKE 1 TABLET BY MOUTH EVERY DAY  acyclovir (ZOVIRAX) 800 mg tablet Take 800 mg by mouth two (2) times a day.  clonidine (CATAPRES) 0.1 mg tablet No current facility-administered medications for this visit. Neurologic Exam  
 
Mental Status WD/WN adult in NAD, normal grooming VSS 
A&O x 3 PERRL, nonicteric Face with mild left peripheral weakness improved from last visit Speech is fluent and clear No limb ataxia. No abnl movements. Moving all extemities spontaneously and symmetric Normal gait Bilateral distal hand tremor posturally. Left worse than right. No resting tremor. CVS RRR Lungs nonlabored Skin is warm and dry Visit Vitals  /70  Pulse 62  Resp 16  
 Ht 5' 4\" (1.626 m)  Wt 82.2 kg (181 lb 3.2 oz)  SpO2 97%  BMI 31.1 kg/m2 Assessment and Plan Diagnoses and all orders for this visit: 1. Left-sided Bell's palsy 2. Tremor, coarse 69-year-old gentleman with left Bell's palsy having some improvement. He may not have full resolution but improvement enough where he is able to close his left eye. He is having some dry eye and if it gets worse he will need to see ophthalmology. No need for additional steroids. Tremor is still baseline.   I would not add any medication for treatment unless it began to interfere with ADLs. Follow-up in one year if needed. Thank you for giving me the opportunity to assist in the care of Mr. Rama George. If you have questions, please do not hesitate to contact me. Sincerely, [address] [facility], DO Neurologist 
Diplomate [employer]

## 2017-10-10 NOTE — PROGRESS NOTES
Chief Complaint   Patient presents with    Facial Droop     f/u       HPI    Angelica Duncan is a 59-year-old gentleman whom I saw on July 9 for left Bell's palsy. I placed him on a steroid pack. He has had good improvement with mild deficit currently. He is also still suffering the same tremor he had in July. No change of metoprolol. He is able to close his eye at bedtime now. Still some dryness. Review of Systems   Eyes:        Dry eye   Neurological: Positive for tremors. All other systems reviewed and are negative. Past Medical History:   Diagnosis Date    Anemia     Cancer ([address]     Cancer of ascending colon ([facility] [address] 9/17/2010    Carcinoma in situ of colon     Chronic cholecystitis     Ringing in ears     SURGERY FOLLOWUP NOS 9/17/2010    Wears glasses      Family History   Problem Relation Age of Onset    Heart Failure Mother     Heart Attack Father      Social History     Social History    Marital status:      Spouse name: N/A    Number of children: N/A    Years of education: N/A     Occupational History    Not on file. Social History Main Topics    Smoking status: Current Every Day Smoker    Smokeless tobacco: Never Used    Alcohol use Yes    Drug use: Not on file    Sexual activity: Not on file     Other Topics Concern    Not on file     Social History Narrative     No Known Allergies      Current Outpatient Prescriptions   Medication Sig    atorvastatin (LIPITOR) 40 mg tablet Take  by mouth daily.  valsartan (DIOVAN) 320 mg tablet Take  by mouth daily.  hydroCHLOROthiazide (HYDRODIURIL) 12.5 mg tablet Take 12.5 mg by mouth daily.  metoprolol succinate (TOPROL-XL) 25 mg XL tablet Take  by mouth daily.  erythromycin (ILOTYCIN) ophthalmic ointment Apply 1 ribbon of gel underneath the eyelid up to 4 times daily for lubrication.     valsartan-hydroCHLOROthiazide (DIOVAN-HCT) 320-12.5 mg per tablet TAKE 1 TABLET BY MOUTH EVERY DAY    acyclovir (ZOVIRAX) 800 mg tablet Take 800 mg by mouth two (2) times a day.  clonidine (CATAPRES) 0.1 mg tablet      No current facility-administered medications for this visit. Neurologic Exam     Mental Status        WD/WN adult in NAD, normal grooming  VSS  A&O x 3    PERRL, nonicteric  Face with mild left peripheral weakness improved from last visit  Speech is fluent and clear  No limb ataxia. No abnl movements. Moving all extemities spontaneously and symmetric  Normal gait  Bilateral distal hand tremor posturally. Left worse than right. No resting tremor. CVS RRR  Lungs nonlabored  Skin is warm and dry         Visit Vitals    /70    Pulse 62    Resp 16    Ht 5' 4\" (1.626 m)    Wt 82.2 kg (181 lb 3.2 oz)    SpO2 97%    BMI 31.1 kg/m2       Assessment and Plan   Diagnoses and all orders for this visit:    1. Left-sided Bell's palsy    2. Tremor, coarse      66year-old gentleman with left Bell's palsy having some improvement. He may not have full resolution but improvement enough where he is able to close his left eye. He is having some dry eye and if it gets worse he will need to see ophthalmology. No need for additional steroids. Tremor is still baseline. I would not add any medication for treatment unless it began to interfere with ADLs. Follow-up in one year if needed. I reviewed and decided to continue the current medications.       [address] [facility], [address]  Diplomate ABPN

## 2017-10-10 NOTE — COMMUNICATION BODY
Chief Complaint   Patient presents with    Facial Droop     f/u       HPI    Charmaine Dias is a 41-year-old gentleman whom I saw on July 9 for left Bell's palsy. I placed him on a steroid pack. He has had good improvement with mild deficit currently. He is also still suffering the same tremor he had in July. No change of metoprolol. He is able to close his eye at bedtime now. Still some dryness. Review of Systems   Eyes:        Dry eye   Neurological: Positive for tremors. All other systems reviewed and are negative. Past Medical History:   Diagnosis Date    Anemia     Cancer ([address]     Cancer of ascending colon ([facility] [address] 9/17/2010    Carcinoma in situ of colon     Chronic cholecystitis     Ringing in ears     SURGERY FOLLOWUP NOS 9/17/2010    Wears glasses      Family History   Problem Relation Age of Onset    Heart Failure Mother     Heart Attack Father      Social History     Social History    Marital status:      Spouse name: N/A    Number of children: N/A    Years of education: N/A     Occupational History    Not on file. Social History Main Topics    Smoking status: Current Every Day Smoker    Smokeless tobacco: Never Used    Alcohol use Yes    Drug use: Not on file    Sexual activity: Not on file     Other Topics Concern    Not on file     Social History Narrative     No Known Allergies      Current Outpatient Prescriptions   Medication Sig    atorvastatin (LIPITOR) 40 mg tablet Take  by mouth daily.  valsartan (DIOVAN) 320 mg tablet Take  by mouth daily.  hydroCHLOROthiazide (HYDRODIURIL) 12.5 mg tablet Take 12.5 mg by mouth daily.  metoprolol succinate (TOPROL-XL) 25 mg XL tablet Take  by mouth daily.  erythromycin (ILOTYCIN) ophthalmic ointment Apply 1 ribbon of gel underneath the eyelid up to 4 times daily for lubrication.     valsartan-hydroCHLOROthiazide (DIOVAN-HCT) 320-12.5 mg per tablet TAKE 1 TABLET BY MOUTH EVERY DAY    acyclovir (ZOVIRAX) 800 mg tablet Take 800 mg by mouth two (2) times a day.  clonidine (CATAPRES) 0.1 mg tablet      No current facility-administered medications for this visit. Neurologic Exam     Mental Status        WD/WN adult in NAD, normal grooming  VSS  A&O x 3    PERRL, nonicteric  Face with mild left peripheral weakness improved from last visit  Speech is fluent and clear  No limb ataxia. No abnl movements. Moving all extemities spontaneously and symmetric  Normal gait  Bilateral distal hand tremor posturally. Left worse than right. No resting tremor. CVS RRR  Lungs nonlabored  Skin is warm and dry         Visit Vitals    /70    Pulse 62    Resp 16    Ht 5' 4\" (1.626 m)    Wt 82.2 kg (181 lb 3.2 oz)    SpO2 97%    BMI 31.1 kg/m2       Assessment and Plan   Diagnoses and all orders for this visit:    1. Left-sided Bell's palsy    2. Tremor, coarse      66year-old gentleman with left Bell's palsy having some improvement. He may not have full resolution but improvement enough where he is able to close his left eye. He is having some dry eye and if it gets worse he will need to see ophthalmology. No need for additional steroids. Tremor is still baseline. I would not add any medication for treatment unless it began to interfere with ADLs. Follow-up in one year if needed. I reviewed and decided to continue the current medications.       [address] [facility], [address]  Diplomate ABPN

## 2022-08-06 ENCOUNTER — APPOINTMENT (OUTPATIENT)
Dept: CT IMAGING | Age: 77
End: 2022-08-06
Attending: STUDENT IN AN ORGANIZED HEALTH CARE EDUCATION/TRAINING PROGRAM
Payer: MEDICARE

## 2022-08-06 ENCOUNTER — APPOINTMENT (OUTPATIENT)
Dept: GENERAL RADIOLOGY | Age: 77
End: 2022-08-06
Attending: STUDENT IN AN ORGANIZED HEALTH CARE EDUCATION/TRAINING PROGRAM
Payer: MEDICARE

## 2022-08-06 ENCOUNTER — HOSPITAL ENCOUNTER (EMERGENCY)
Age: 77
Discharge: HOME | End: 2022-08-06
Attending: STUDENT IN AN ORGANIZED HEALTH CARE EDUCATION/TRAINING PROGRAM
Payer: MEDICARE

## 2022-08-06 VITALS
RESPIRATION RATE: 15 BRPM | HEART RATE: 84 BPM | WEIGHT: 160.5 LBS | SYSTOLIC BLOOD PRESSURE: 164 MMHG | BODY MASS INDEX: 27.55 KG/M2 | TEMPERATURE: 97.9 F | DIASTOLIC BLOOD PRESSURE: 78 MMHG | OXYGEN SATURATION: 94 %

## 2022-08-06 DIAGNOSIS — F10.920 ALCOHOLIC INTOXICATION WITHOUT COMPLICATION (HCC): Primary | ICD-10-CM

## 2022-08-06 LAB
ALBUMIN SERPL-MCNC: 4.2 G/DL (ref 3.5–5)
ALBUMIN/GLOB SERPL: 1.2 {RATIO} (ref 1.1–2.2)
ALP SERPL-CCNC: 80 U/L (ref 45–117)
ALT SERPL-CCNC: 25 U/L (ref 12–78)
ANION GAP SERPL CALC-SCNC: 14 MMOL/L (ref 5–15)
APPEARANCE UR: CLEAR
AST SERPL-CCNC: 27 U/L (ref 15–37)
ATRIAL RATE: 60 BPM
BACTERIA URNS QL MICRO: NEGATIVE /HPF
BASOPHILS # BLD: 0 K/UL (ref 0–0.1)
BASOPHILS NFR BLD: 0 % (ref 0–1)
BILIRUB SERPL-MCNC: 0.6 MG/DL (ref 0.2–1)
BILIRUB UR QL: NEGATIVE
BUN SERPL-MCNC: 18 MG/DL (ref 6–20)
BUN/CREAT SERPL: 16 (ref 12–20)
CALCIUM SERPL-MCNC: 9.1 MG/DL (ref 8.5–10.1)
CALCULATED R AXIS, ECG10: -35 DEGREES
CALCULATED T AXIS, ECG11: -4 DEGREES
CHLORIDE SERPL-SCNC: 103 MMOL/L (ref 97–108)
CO2 SERPL-SCNC: 21 MMOL/L (ref 21–32)
COLOR UR: NORMAL
CREAT SERPL-MCNC: 1.13 MG/DL (ref 0.7–1.3)
DIAGNOSIS, 93000: NORMAL
DIFFERENTIAL METHOD BLD: NORMAL
EOSINOPHIL # BLD: 0.1 K/UL (ref 0–0.4)
EOSINOPHIL NFR BLD: 2 % (ref 0–7)
EPITH CASTS URNS QL MICRO: NORMAL /LPF
ERYTHROCYTE [DISTWIDTH] IN BLOOD BY AUTOMATED COUNT: 12.5 % (ref 11.5–14.5)
ETHANOL SERPL-MCNC: 173 MG/DL
GLOBULIN SER CALC-MCNC: 3.5 G/DL (ref 2–4)
GLUCOSE SERPL-MCNC: 90 MG/DL (ref 65–100)
GLUCOSE UR STRIP.AUTO-MCNC: NEGATIVE MG/DL
HCT VFR BLD AUTO: 41.2 % (ref 36.6–50.3)
HGB BLD-MCNC: 14.4 G/DL (ref 12.1–17)
HGB UR QL STRIP: NEGATIVE
IMM GRANULOCYTES # BLD AUTO: 0 K/UL (ref 0–0.04)
IMM GRANULOCYTES NFR BLD AUTO: 0 % (ref 0–0.5)
KETONES UR QL STRIP.AUTO: NEGATIVE MG/DL
LEUKOCYTE ESTERASE UR QL STRIP.AUTO: NEGATIVE
LYMPHOCYTES # BLD: 2 K/UL (ref 0.8–3.5)
LYMPHOCYTES NFR BLD: 28 % (ref 12–49)
MCH RBC QN AUTO: 30.8 PG (ref 26–34)
MCHC RBC AUTO-ENTMCNC: 35 G/DL (ref 30–36.5)
MCV RBC AUTO: 88.2 FL (ref 80–99)
MONOCYTES # BLD: 0.5 K/UL (ref 0–1)
MONOCYTES NFR BLD: 7 % (ref 5–13)
NEUTS SEG # BLD: 4.5 K/UL (ref 1.8–8)
NEUTS SEG NFR BLD: 63 % (ref 32–75)
NITRITE UR QL STRIP.AUTO: NEGATIVE
NRBC # BLD: 0 K/UL (ref 0–0.01)
NRBC BLD-RTO: 0 PER 100 WBC
P-R INTERVAL, ECG05: 170 MS
PH UR STRIP: 6 [PH] (ref 5–8)
PLATELET # BLD AUTO: 156 K/UL (ref 150–400)
PMV BLD AUTO: 9.9 FL (ref 8.9–12.9)
POTASSIUM SERPL-SCNC: 3.6 MMOL/L (ref 3.5–5.1)
PROT SERPL-MCNC: 7.7 G/DL (ref 6.4–8.2)
PROT UR STRIP-MCNC: NEGATIVE MG/DL
Q-T INTERVAL, ECG07: 440 MS
QRS DURATION, ECG06: 94 MS
QTC CALCULATION (BEZET), ECG08: 440 MS
RBC # BLD AUTO: 4.67 M/UL (ref 4.1–5.7)
RBC #/AREA URNS HPF: NORMAL /HPF (ref 0–5)
SODIUM SERPL-SCNC: 138 MMOL/L (ref 136–145)
SP GR UR REFRACTOMETRY: <1.005 (ref 1–1.03)
TROPONIN-HIGH SENSITIVITY: 13 NG/L (ref 0–76)
UA: UC IF INDICATED,UAUC: NORMAL
UROBILINOGEN UR QL STRIP.AUTO: 0.2 EU/DL (ref 0.2–1)
VENTRICULAR RATE, ECG03: 60 BPM
WBC # BLD AUTO: 7.1 K/UL (ref 4.1–11.1)
WBC URNS QL MICRO: NORMAL /HPF (ref 0–4)

## 2022-08-06 PROCEDURE — 74011250636 HC RX REV CODE- 250/636: Performed by: STUDENT IN AN ORGANIZED HEALTH CARE EDUCATION/TRAINING PROGRAM

## 2022-08-06 PROCEDURE — 80053 COMPREHEN METABOLIC PANEL: CPT

## 2022-08-06 PROCEDURE — 71045 X-RAY EXAM CHEST 1 VIEW: CPT

## 2022-08-06 PROCEDURE — 96360 HYDRATION IV INFUSION INIT: CPT

## 2022-08-06 PROCEDURE — 70450 CT HEAD/BRAIN W/O DYE: CPT

## 2022-08-06 PROCEDURE — 93005 ELECTROCARDIOGRAM TRACING: CPT

## 2022-08-06 PROCEDURE — 36415 COLL VENOUS BLD VENIPUNCTURE: CPT

## 2022-08-06 PROCEDURE — 82077 ASSAY SPEC XCP UR&BREATH IA: CPT

## 2022-08-06 PROCEDURE — 81001 URINALYSIS AUTO W/SCOPE: CPT

## 2022-08-06 PROCEDURE — 99285 EMERGENCY DEPT VISIT HI MDM: CPT

## 2022-08-06 PROCEDURE — 85025 COMPLETE CBC W/AUTO DIFF WBC: CPT

## 2022-08-06 PROCEDURE — 84484 ASSAY OF TROPONIN QUANT: CPT

## 2022-08-06 RX ADMIN — SODIUM CHLORIDE 1000 ML: 9 INJECTION, SOLUTION INTRAVENOUS at 13:17

## 2022-08-06 NOTE — DISCHARGE INSTRUCTIONS
Please follow-up with primary care doctor next 2 to 3 days for reevaluation to symptoms. Return emerged from for any worsening or concerning symptoms.

## 2022-08-06 NOTE — ED PROVIDER NOTES
This is a 65-year-old male with a past medical history of anemia, alcohol use is brought to the ED via EMS for evaluation of altered mental status, fall. It was reported by patient's wife that she found him sitting on the floor next to the bed earlier in the morning patient endorsed drinking scotch that morning and did not know how much. He is not exactly sure how he ended up on the floor. He denies losing consciousness. He denies any chest pain, shortness of breath, headache, neck pain back pain or extremity pain. Patient reports that he lives at home with his wife who is legally blind. He is her main caretaker. EMS reported that there is concern for poor conditions in the house. Patient has no other complaints at this time and wants to go home. Fall  Pertinent negatives include no fever and no headaches.       Past Medical History:   Diagnosis Date    Anemia     Cancer ([address]     Cancer of ascending colon ([facility] [address] 9/17/2010    Carcinoma in situ of colon     Chronic cholecystitis     Ringing in ears     SURGERY FOLLOWUP NOS 9/17/2010    Wears glasses        Past Surgical History:   Procedure Laterality Date    LAP,CHOLECYSTECTOMY  9/2/10    LAP,SURG,COLECTOMY,W/REMVL TERM ILEUM  9/2/10         Family History:   Problem Relation Age of Onset    Heart Failure Mother     Heart Attack Father        Social History     Socioeconomic History    Marital status:      Spouse name: Not on file    Number of children: Not on file    Years of education: Not on file    Highest education level: Not on file   Occupational History    Not on file   Tobacco Use    Smoking status: Every Day    Smokeless tobacco: Never   Substance and Sexual Activity    Alcohol use: Yes    Drug use: Not on file    Sexual activity: Not on file   Other Topics Concern    Not on file   Social History Narrative    Not on file     Social Determinants of Health     Financial Resource Strain: Not on file   Food Insecurity: Not on file Transportation Needs: Not on file   Physical Activity: Not on file   Stress: Not on file   Social Connections: Not on file   Intimate Partner Violence: Not on file   Housing Stability: Not on file         ALLERGIES: Patient has no known allergies. Review of Systems   Constitutional:  Negative for chills, fatigue and fever. HENT:  Negative for ear pain. Eyes:  Negative for visual disturbance. Respiratory:  Negative for shortness of breath. Cardiovascular:  Negative for chest pain. Genitourinary:  Negative for flank pain. Musculoskeletal:  Negative for back pain and neck pain. Skin:  Negative for rash and wound. Neurological:  Negative for dizziness, syncope and headaches. Psychiatric/Behavioral:  Negative for confusion. All other systems reviewed and are negative. Vitals:    08/06/22 1228   BP: (!) 187/76   Pulse: 63   Resp: 16   Temp: 97.9 °F (36.6 °C)   SpO2: 99%   Weight: 72.8 kg (160 lb 7.9 oz)            Physical Exam  Vitals and nursing note reviewed. Constitutional:       General: He is not in acute distress. Appearance: Normal appearance. He is normal weight. He is not ill-appearing or toxic-appearing. HENT:      Head: Normocephalic and atraumatic. Right Ear: Tympanic membrane, ear canal and external ear normal.      Left Ear: Tympanic membrane, ear canal and external ear normal.      Nose: Nose normal.      Mouth/Throat:      Mouth: Mucous membranes are moist.      Pharynx: Oropharynx is clear. Eyes:      Extraocular Movements: Extraocular movements intact. Conjunctiva/sclera: Conjunctivae normal.      Pupils: Pupils are equal, round, and reactive to light. Cardiovascular:      Rate and Rhythm: Normal rate and regular rhythm. Pulses: Normal pulses. Heart sounds: Normal heart sounds. Pulmonary:      Effort: Pulmonary effort is normal. No respiratory distress. Breath sounds: Normal breath sounds. No wheezing.    Abdominal:      General: Abdomen is flat. Bowel sounds are normal.      Palpations: Abdomen is soft. Tenderness: There is no abdominal tenderness. There is no guarding. Genitourinary:     Comments: Deferred  Musculoskeletal:         General: No swelling, tenderness, deformity or signs of injury. Normal range of motion. Cervical back: Normal range of motion and neck supple. No tenderness. Lymphadenopathy:      Cervical: No cervical adenopathy. Skin:     General: Skin is warm and dry. Capillary Refill: Capillary refill takes less than 2 seconds. Findings: No bruising or rash. Neurological:      General: No focal deficit present. Mental Status: He is alert and oriented to person, place, and time. Mental status is at baseline. Cranial Nerves: No cranial nerve deficit. Sensory: No sensory deficit. Motor: No weakness. Comments: Moving all extremities   Psychiatric:         Mood and Affect: Mood normal.         Behavior: Behavior normal.         Thought Content: Thought content normal.         Judgment: Judgment normal.      Comments: Has decision making capacity        MDM  Number of Diagnoses or Management Options  Alcoholic intoxication without complication ([facility] [address]  Diagnosis management comments: Differential diagnosis includes limited to alcohol intoxication, stroke, electrolyte abnormality, intracranial hemorrhage, ACS, dysrhythmia    Well-appearing 39-year-old male brought in by EMS for difficulty getting up off the floor. Patient lives with his wife and he is the main caretaker for her. She found him on the floor confused earlier this morning. Patient endorses drinking \"quite a bit of scotch this morning \"but not sure how much. He is alert awake and oriented. ED evaluation included CBC which is unremarkable, CMP as well as unremarkable. Urinalysis is normal, high sensitivity troponin of 13, patient is not having chest pain.     ECG shows normal sinus rhythm, rate of 60, left axis deviation. CT head without IV contrast was performed showing no acute intracranial hemorrhage mass or infarct, chest x-ray showed no acute abnormalities. Patient received a liter of normal saline, alcohol level 173. On reevaluation the patient's wife and daughter have arrived. Wife reports that she had difficulty getting him off the floor this morning. Discussed negative labs and imaging with the patient and family. Discussed that his symptoms likely were secondary to alcohol intoxication. The family would like to take him home. Discussed that if they were able to observe him that this would be appropriate. He is alert, awake and oriented, has good medical decision making capacity. Family is okay with this plan. Discussed that he should not drink as much. Patient is okay to be discharged home with follow-up PCP. Return precautions discussed and agreed upon prior to discharge. Patient leaves the ED in stable condition      ED Course as of 08/09/22 1303   Sat Aug 06, 2022   1840 Patient is alert, awake, oriented, patient is with family. Patient reports that he drank scotch this morning. He feels comfortable going home. He is alert awake, has good decision-making capacity. Discussed the patient should not drink as much. If he has worsening symptoms to return the ER. Patient's wife and daughter feel comfortable with this plan.   Patient is discharged in ED in stable condition [WG]      ED Course User Index  [WG] Yadira Gardner DO       Procedures
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
## 2022-08-06 NOTE — ED NOTES
Pain assessment on discharge was   Condition Stable  Patient discharged to home  Patient education was completed  Education taught to patient and family caregivers by Dr. Ronak Diana and Johnny Beasley RN   Teaching method used was handout and verbal  Understanding of teaching was good  Patient was discharged ambulatory  Discharged with family  Valuables were given to: patient remained in possession of all belongings during stay.

## 2022-09-15 ENCOUNTER — TRANSCRIBE ORDER (OUTPATIENT)
Dept: SCHEDULING | Age: 77
End: 2022-09-15

## 2022-09-15 DIAGNOSIS — R41.3 MEMORY LOSS: Primary | ICD-10-CM

## 2023-04-21 DIAGNOSIS — R41.3 MEMORY LOSS: Primary | ICD-10-CM

## 2023-07-21 ENCOUNTER — APPOINTMENT (OUTPATIENT)
Facility: HOSPITAL | Age: 78
End: 2023-07-21
Payer: MEDICARE

## 2023-07-21 ENCOUNTER — HOSPITAL ENCOUNTER (EMERGENCY)
Facility: HOSPITAL | Age: 78
Discharge: HOME | End: 2023-07-21
Attending: EMERGENCY MEDICINE | Admitting: EMERGENCY MEDICINE
Payer: MEDICARE

## 2023-07-21 VITALS
WEIGHT: 141.76 LBS | DIASTOLIC BLOOD PRESSURE: 75 MMHG | SYSTOLIC BLOOD PRESSURE: 149 MMHG | RESPIRATION RATE: 17 BRPM | HEART RATE: 76 BPM | HEIGHT: 64 IN | BODY MASS INDEX: 24.2 KG/M2 | TEMPERATURE: 98.9 F | OXYGEN SATURATION: 96 %

## 2023-07-21 DIAGNOSIS — Z00.00 GENERAL MEDICAL EXAMINATION: Primary | ICD-10-CM

## 2023-07-21 LAB
ALBUMIN SERPL-MCNC: 4 G/DL (ref 3.5–5)
ALBUMIN/GLOB SERPL: 1.1 (ref 1.1–2.2)
ALP SERPL-CCNC: 86 U/L (ref 45–117)
ALT SERPL-CCNC: 34 U/L (ref 12–78)
ANION GAP SERPL CALC-SCNC: 9 MMOL/L (ref 5–15)
AST SERPL-CCNC: 43 U/L (ref 15–37)
BASOPHILS # BLD: 0 K/UL (ref 0–0.1)
BASOPHILS NFR BLD: 0 % (ref 0–1)
BILIRUB SERPL-MCNC: 0.8 MG/DL (ref 0.2–1)
BUN SERPL-MCNC: 21 MG/DL (ref 6–20)
BUN/CREAT SERPL: 19 (ref 12–20)
CALCIUM SERPL-MCNC: 9.4 MG/DL (ref 8.5–10.1)
CHLORIDE SERPL-SCNC: 108 MMOL/L (ref 97–108)
CO2 SERPL-SCNC: 22 MMOL/L (ref 21–32)
COMMENT:: NORMAL
CREAT SERPL-MCNC: 1.13 MG/DL (ref 0.7–1.3)
DIFFERENTIAL METHOD BLD: NORMAL
EKG DIAGNOSIS: NORMAL
EKG Q-T INTERVAL: 406 MS
EKG QRS DURATION: 100 MS
EKG QTC CALCULATION (BAZETT): 444 MS
EKG R AXIS: -42 DEGREES
EKG T AXIS: 2 DEGREES
EKG VENTRICULAR RATE: 72 BPM
EOSINOPHIL # BLD: 0.1 K/UL (ref 0–0.4)
EOSINOPHIL NFR BLD: 1 % (ref 0–7)
ERYTHROCYTE [DISTWIDTH] IN BLOOD BY AUTOMATED COUNT: 12.9 % (ref 11.5–14.5)
GLOBULIN SER CALC-MCNC: 3.8 G/DL (ref 2–4)
GLUCOSE SERPL-MCNC: 85 MG/DL (ref 65–100)
HCT VFR BLD AUTO: 44.2 % (ref 36.6–50.3)
HGB BLD-MCNC: 14.4 G/DL (ref 12.1–17)
IMM GRANULOCYTES # BLD AUTO: 0 K/UL (ref 0–0.04)
IMM GRANULOCYTES NFR BLD AUTO: 0 % (ref 0–0.5)
LYMPHOCYTES # BLD: 1.4 K/UL (ref 0.8–3.5)
LYMPHOCYTES NFR BLD: 17 % (ref 12–49)
MCH RBC QN AUTO: 28.8 PG (ref 26–34)
MCHC RBC AUTO-ENTMCNC: 32.6 G/DL (ref 30–36.5)
MCV RBC AUTO: 88.4 FL (ref 80–99)
MONOCYTES # BLD: 0.6 K/UL (ref 0–1)
MONOCYTES NFR BLD: 8 % (ref 5–13)
NEUTS SEG # BLD: 5.8 K/UL (ref 1.8–8)
NEUTS SEG NFR BLD: 74 % (ref 32–75)
NRBC # BLD: 0 K/UL (ref 0–0.01)
NRBC BLD-RTO: 0 PER 100 WBC
PLATELET # BLD AUTO: 174 K/UL (ref 150–400)
PMV BLD AUTO: 9.8 FL (ref 8.9–12.9)
POTASSIUM SERPL-SCNC: 3.1 MMOL/L (ref 3.5–5.1)
PROT SERPL-MCNC: 7.8 G/DL (ref 6.4–8.2)
RBC # BLD AUTO: 5 M/UL (ref 4.1–5.7)
SODIUM SERPL-SCNC: 139 MMOL/L (ref 136–145)
SPECIMEN HOLD: NORMAL
WBC # BLD AUTO: 7.9 K/UL (ref 4.1–11.1)

## 2023-07-21 PROCEDURE — 99285 EMERGENCY DEPT VISIT HI MDM: CPT

## 2023-07-21 PROCEDURE — 36415 COLL VENOUS BLD VENIPUNCTURE: CPT

## 2023-07-21 PROCEDURE — 85025 COMPLETE CBC W/AUTO DIFF WBC: CPT

## 2023-07-21 PROCEDURE — 93005 ELECTROCARDIOGRAM TRACING: CPT | Performed by: EMERGENCY MEDICINE

## 2023-07-21 PROCEDURE — 93010 ELECTROCARDIOGRAM REPORT: CPT | Performed by: SPECIALIST

## 2023-07-21 PROCEDURE — 80053 COMPREHEN METABOLIC PANEL: CPT

## 2023-07-21 PROCEDURE — 71045 X-RAY EXAM CHEST 1 VIEW: CPT

## 2023-07-21 ASSESSMENT — ENCOUNTER SYMPTOMS
SINUS PRESSURE: 0
EYES NEGATIVE: 1
WHEEZING: 0
COUGH: 0
BLOOD IN STOOL: 0
DIARRHEA: 0
RHINORRHEA: 0
SINUS PAIN: 0
CHEST TIGHTNESS: 0
VOMITING: 0
STRIDOR: 0
NAUSEA: 0
BACK PAIN: 0
TROUBLE SWALLOWING: 0
SHORTNESS OF BREATH: 0
ABDOMINAL PAIN: 0
COLOR CHANGE: 0
SORE THROAT: 0

## 2023-07-21 NOTE — CARE COORDINATION
[phone] Call received from Justin Mcnally states she is step daughter and fearful of patient . Informed he stands in the way of her trying to provide care for her mother including meals/ feeding and medications. Report odd behaviors at night when she is trying to sleep and locking out neighbor to check on spouse when she is at work. No children of Keren Vital noted has been  to spouse for 40 years. Does not know financial information on parents. APS Worker Alex Carrasco [phone]. [phone] Call placed to Rex AlmendarezLidia mobile VM left alternative made to [address] [facility] transferred to office line detailed message left. Call received from [facility], [address] [facility] provided additional information. Couple seen on 7/20/23 APS did not provide any resources at time of visit was trying to diffuse situation Spouse refused to come to the ED on 7/20/23. Last year offered [facility] information and spouse declined. Worker acknowledged home is has food stuffs and hoarded items. APS goal is to assist w/ private resources in the home . Couple has multiple accounts and patient previously invested. Step daughter has access to 1 account w/ 200K. Informed agency can also assist w/ clean up processes. Patient and spouse do no have children together and daughter is living in patient's home. Informed worker plan was to discharge back home today. [phone] [facility] placed to [facility] requesting transport for patient and spouse. Spoke w/ Rexene Cambodian will need spouse brought out in wheelchair to ambulance and will accompany spouse home. ETA 1730 ([facility] ). Updates provided to [facility]. Mrs. Marvin Ratliff expressed the need to have spouse placed in a facility while home is being cleaned up. Permission obtained from spouse to send referrals care patrol and   at home harmon. Referrals sent. 1720 VM left for INDRA Lea Worker of patient discharge home.

## 2023-07-21 NOTE — CARE COORDINATION
CM consult received and appreciated. EMR reviewed. Patient presents to the ED s/p multiple falls/ and EMS multiple visits to home. EMS hs noted mold and bugs in the home. Patient w/ AMS. APS/ SWK and  have been out to residence . Met w/patient and introduced to role of CM. Patient able to state full name/ / and current year. Patient unable to name current president states he is at [facility]. When  asked why here states \" I think I passed out. \" Patient unable to recall 2 sons and names. Trish Watt states he hopes to go home w/ his wife when discharged \" but I don't know where home is. \"    No AMD on file. Karthikeyan Marinelli is emergency contact currently in [facility]. Daughter is Sandra Ray -  left on mobile / home numbers [phone] / [phone]. Updates provided to . This writer will have [employer] on residence and ask daughter to contact CM. [phone] Call placed to [facility] spoke w/ Officer Arpit Hardy requested non emergent  visit to home to reach daughter Sandra Ray and request call back to ED/CM since elderly parent has been in the ED for 3 hours. [phone] Call received from Officer Shaji informed no one is at residence attempted last employer of Sandra Ray (daughter ) has not been at work in 3 weeks.

## 2023-07-21 NOTE — DISCHARGE INSTRUCTIONS
Continue your occasions as directed and follow-up with your own physician in the next several days for further evaluation and treatment.

## 2023-07-21 NOTE — ED TRIAGE NOTES
Pt arrives from home via EMS with CC of failure to thrive.  Pt lives at home with his wife and both have had multiple falls and the home has some mold issues
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
## 2023-07-21 NOTE — ED NOTES
Pt was wheeled to the Ambulance. Pt was able to go in the [place] without any problem.        Unknown ORALIA Brumfield  07/21/23 [id number]

## 2023-07-21 NOTE — ED NOTES
Patient comes from home and the provider stated that they are covered in bites so patient taken to decon shower prior to coming to room.       Ynes Bowens RN  07/21/23 0974

## 2025-07-11 ENCOUNTER — APPOINTMENT (OUTPATIENT)
Facility: HOSPITAL | Age: 80
End: 2025-07-11
Payer: MEDICARE

## 2025-07-11 ENCOUNTER — HOSPITAL ENCOUNTER (INPATIENT)
Facility: HOSPITAL | Age: 80
LOS: 6 days | Discharge: NURSING, REHAB OR LONG-TERM CARE | End: 2025-07-18
Attending: STUDENT IN AN ORGANIZED HEALTH CARE EDUCATION/TRAINING PROGRAM | Admitting: FAMILY MEDICINE
Payer: MEDICARE

## 2025-07-11 DIAGNOSIS — R41.0 DELIRIUM: ICD-10-CM

## 2025-07-11 DIAGNOSIS — D72.829 LEUKOCYTOSIS, UNSPECIFIED TYPE: ICD-10-CM

## 2025-07-11 DIAGNOSIS — R10.9 ABDOMINAL PAIN, UNSPECIFIED ABDOMINAL LOCATION: ICD-10-CM

## 2025-07-11 DIAGNOSIS — R11.2 NAUSEA AND VOMITING, UNSPECIFIED VOMITING TYPE: Primary | ICD-10-CM

## 2025-07-11 DIAGNOSIS — K56.609 INTESTINAL OBSTRUCTION, UNSPECIFIED CAUSE, UNSPECIFIED WHETHER PARTIAL OR COMPLETE (HCC): ICD-10-CM

## 2025-07-11 DIAGNOSIS — R62.7 FAILURE TO THRIVE IN ADULT: ICD-10-CM

## 2025-07-11 LAB
ALBUMIN SERPL-MCNC: 4.2 G/DL (ref 3.5–5)
ALBUMIN/GLOB SERPL: 1.1 (ref 1.1–2.2)
ALP SERPL-CCNC: 88 U/L (ref 45–117)
ALT SERPL-CCNC: 22 U/L (ref 12–78)
ANION GAP SERPL CALC-SCNC: 10 MMOL/L (ref 2–12)
AST SERPL-CCNC: 21 U/L (ref 15–37)
BASOPHILS # BLD: 0.05 K/UL (ref 0–0.1)
BASOPHILS NFR BLD: 0.3 % (ref 0–1)
BILIRUB SERPL-MCNC: 0.6 MG/DL (ref 0.2–1)
BUN SERPL-MCNC: 24 MG/DL (ref 6–20)
BUN/CREAT SERPL: 18 (ref 12–20)
CALCIUM SERPL-MCNC: 10.5 MG/DL (ref 8.5–10.1)
CHLORIDE SERPL-SCNC: 103 MMOL/L (ref 97–108)
CO2 SERPL-SCNC: 23 MMOL/L (ref 21–32)
COMMENT:: NORMAL
CREAT SERPL-MCNC: 1.3 MG/DL (ref 0.7–1.3)
DIFFERENTIAL METHOD BLD: ABNORMAL
EOSINOPHIL # BLD: 0.02 K/UL (ref 0–0.4)
EOSINOPHIL NFR BLD: 0.1 % (ref 0–7)
ERYTHROCYTE [DISTWIDTH] IN BLOOD BY AUTOMATED COUNT: 13.1 % (ref 11.5–14.5)
GLOBULIN SER CALC-MCNC: 3.8 G/DL (ref 2–4)
GLUCOSE SERPL-MCNC: 147 MG/DL (ref 65–100)
HCT VFR BLD AUTO: 43.5 % (ref 36.6–50.3)
HGB BLD-MCNC: 14.2 G/DL (ref 12.1–17)
IMM GRANULOCYTES # BLD AUTO: 0.06 K/UL (ref 0–0.04)
IMM GRANULOCYTES NFR BLD AUTO: 0.4 % (ref 0–0.5)
LYMPHOCYTES # BLD: 0.92 K/UL (ref 0.8–3.5)
LYMPHOCYTES NFR BLD: 5.8 % (ref 12–49)
MCH RBC QN AUTO: 28.6 PG (ref 26–34)
MCHC RBC AUTO-ENTMCNC: 32.6 G/DL (ref 30–36.5)
MCV RBC AUTO: 87.5 FL (ref 80–99)
MONOCYTES # BLD: 0.51 K/UL (ref 0–1)
MONOCYTES NFR BLD: 3.2 % (ref 5–13)
NEUTS SEG # BLD: 14.24 K/UL (ref 1.8–8)
NEUTS SEG NFR BLD: 90.2 % (ref 32–75)
NRBC # BLD: 0 K/UL (ref 0–0.01)
NRBC BLD-RTO: 0 PER 100 WBC
PLATELET # BLD AUTO: 224 K/UL (ref 150–400)
PMV BLD AUTO: 10.3 FL (ref 8.9–12.9)
POTASSIUM SERPL-SCNC: 3.8 MMOL/L (ref 3.5–5.1)
PROT SERPL-MCNC: 8 G/DL (ref 6.4–8.2)
RBC # BLD AUTO: 4.97 M/UL (ref 4.1–5.7)
RBC MORPH BLD: ABNORMAL
SODIUM SERPL-SCNC: 136 MMOL/L (ref 136–145)
SPECIMEN HOLD: NORMAL
TROPONIN I SERPL HS-MCNC: 9 NG/L (ref 0–76)
WBC # BLD AUTO: 15.8 K/UL (ref 4.1–11.1)

## 2025-07-11 PROCEDURE — 80053 COMPREHEN METABOLIC PANEL: CPT

## 2025-07-11 PROCEDURE — 85025 COMPLETE CBC W/AUTO DIFF WBC: CPT

## 2025-07-11 PROCEDURE — 99285 EMERGENCY DEPT VISIT HI MDM: CPT

## 2025-07-11 PROCEDURE — 87086 URINE CULTURE/COLONY COUNT: CPT

## 2025-07-11 PROCEDURE — 6360000002 HC RX W HCPCS: Performed by: EMERGENCY MEDICINE

## 2025-07-11 PROCEDURE — 2500000003 HC RX 250 WO HCPCS

## 2025-07-11 PROCEDURE — 96372 THER/PROPH/DIAG INJ SC/IM: CPT

## 2025-07-11 PROCEDURE — 6360000002 HC RX W HCPCS: Performed by: STUDENT IN AN ORGANIZED HEALTH CARE EDUCATION/TRAINING PROGRAM

## 2025-07-11 PROCEDURE — 84484 ASSAY OF TROPONIN QUANT: CPT

## 2025-07-11 PROCEDURE — 96374 THER/PROPH/DIAG INJ IV PUSH: CPT

## 2025-07-11 PROCEDURE — 83690 ASSAY OF LIPASE: CPT

## 2025-07-11 PROCEDURE — 81001 URINALYSIS AUTO W/SCOPE: CPT

## 2025-07-11 PROCEDURE — 93005 ELECTROCARDIOGRAM TRACING: CPT | Performed by: STUDENT IN AN ORGANIZED HEALTH CARE EDUCATION/TRAINING PROGRAM

## 2025-07-11 PROCEDURE — 6360000002 HC RX W HCPCS

## 2025-07-11 PROCEDURE — 2500000003 HC RX 250 WO HCPCS: Performed by: EMERGENCY MEDICINE

## 2025-07-11 PROCEDURE — 82077 ASSAY SPEC XCP UR&BREATH IA: CPT

## 2025-07-11 RX ORDER — IOPAMIDOL 755 MG/ML
100 INJECTION, SOLUTION INTRAVASCULAR
Status: COMPLETED | OUTPATIENT
Start: 2025-07-11 | End: 2025-07-12

## 2025-07-11 RX ORDER — HALOPERIDOL 5 MG/ML
2 INJECTION INTRAMUSCULAR ONCE
Status: COMPLETED | OUTPATIENT
Start: 2025-07-11 | End: 2025-07-11

## 2025-07-11 RX ORDER — WATER 10 ML/10ML
INJECTION INTRAMUSCULAR; INTRAVENOUS; SUBCUTANEOUS
Status: COMPLETED
Start: 2025-07-11 | End: 2025-07-11

## 2025-07-11 RX ORDER — ZIPRASIDONE MESYLATE 20 MG/ML
INJECTION, POWDER, LYOPHILIZED, FOR SOLUTION INTRAMUSCULAR
Status: COMPLETED
Start: 2025-07-11 | End: 2025-07-11

## 2025-07-11 RX ADMIN — ZIPRASIDONE MESYLATE 10 MG: 20 INJECTION, POWDER, LYOPHILIZED, FOR SOLUTION INTRAMUSCULAR at 23:32

## 2025-07-11 RX ADMIN — WATER 10 ML: 1 INJECTION INTRAMUSCULAR; INTRAVENOUS; SUBCUTANEOUS at 23:32

## 2025-07-11 RX ADMIN — ZIPRASIDONE MESYLATE 20 MG: 20 INJECTION, POWDER, LYOPHILIZED, FOR SOLUTION INTRAMUSCULAR at 23:32

## 2025-07-11 RX ADMIN — HALOPERIDOL LACTATE 2 MG: 5 INJECTION, SOLUTION INTRAMUSCULAR at 22:31

## 2025-07-11 ASSESSMENT — PAIN SCALES - GENERAL: PAINLEVEL_OUTOF10: 3

## 2025-07-11 ASSESSMENT — PAIN DESCRIPTION - LOCATION: LOCATION: ABDOMEN;CHEST

## 2025-07-11 NOTE — ED NOTES
Pt was seen trying to get out of bed and stating \"I'm getting out of this place\", patient redirected into the bed. IV secured with coban. Curtains opened fully for best visualization of patient.
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
## 2025-07-11 NOTE — ED TRIAGE NOTES
Pt arrives from home via EMS with cc of N/V and hypertension. Pt poor historian.  per BG.     Denies dizziness, vision changes, numbness/tingling.

## 2025-07-11 NOTE — ED NOTES
Stretcher bed alarm turned on, not seeming to be alarming, concern if it will alarm in time. Bed alarm pad also placed under patient. Awaiting arrival of virtual sitter on machine.

## 2025-07-11 NOTE — ED NOTES
Pt seen putting his legs through the side rails, redirected back into the bed. Virtual sitter #8 placed in the room and order placed for virtual sitter.

## 2025-07-11 NOTE — ED NOTES
Pt states that he has vomited approx 4 times today, last BM, today, reporting that he has had several. Pt states that he drank some milk today, and \"I think so\" when asked about eating yesterday.

## 2025-07-11 NOTE — CARE COORDINATION
5:35 PM    CM met with patients at bedside to introduce self and role. Patient reluctant to speak with CM to address concerns and answer questions.  Patient would often pause for a period of time before answering.  He reported that he and his wife take care of each other and that he did not need additional support at this time.    Chart reviewed.  Per doctors note on 5/22/25 there is an open case with APS.  Hx of hoarding, mold, etc.  Conditions of home are unsafe.  Involvement with poilce in the past.  Attending has recommended AGUSTIN/memory care.  Patient has declined.  Patient with continued memory decline/cognitive deficits.  Spouse open to resources.  She is blind and realizing she would benefit from additional support and help.    ADLs: Reports that he is independent  DME: None  PCP follow up: [facility]: Last seen 5/24/25  Previous Home Health: None  Previous Skilled Nursing Facility: None  Previous Inpatient Rehab: None  Insurance verified: Medicare A&B  Pharmacy: [facility] (mail order)  Emergency Contact: Spouse-Bere Ren [phone]    CM placed call to patient's spouse to introduce self and role.  Spouse reports that patient has Alzheimer's Disease and will often times not allow for help and refuse to take medications.  Patient's step-daughter, Barbi Salguero resides with patient and spouse and has been assisting with care needs per spouse when allowed.  Friend of Daughter has now stepped in as well to assist with care needs such as cleaning and cooking everyday.  Spouse is blind.  She is aware and reports condition of the home is bad.  She feels that they would benefit from additional services if patient would be in agreement.    VIVIEN placed call to [phone] to make an additional report.  Report number is #[id number]. Case will be transferred to [facility] [phone] for continued follow-up.    TRINH plan TBD at this time.  VIVIEN

## 2025-07-11 NOTE — ED NOTES
Pts gown replaced and he promptly removed gown. Pt still requiring constant redirection to remain in bed.

## 2025-07-11 NOTE — ED NOTES
Pt redirected and repositioned into bed. Pt immediately attempting to get back out of bed. Awaiting virtual sitter.

## 2025-07-12 ENCOUNTER — APPOINTMENT (OUTPATIENT)
Facility: HOSPITAL | Age: 80
End: 2025-07-12
Payer: MEDICARE

## 2025-07-12 PROBLEM — K56.609 BOWEL OBSTRUCTION (HCC): Status: ACTIVE | Noted: 2025-07-12

## 2025-07-12 PROBLEM — R11.2 NAUSEA AND VOMITING: Status: ACTIVE | Noted: 2025-07-12

## 2025-07-12 LAB
ALBUMIN SERPL-MCNC: 3.1 G/DL (ref 3.5–5)
ALBUMIN/GLOB SERPL: 1.1 (ref 1.1–2.2)
ALP SERPL-CCNC: 73 U/L (ref 45–117)
ALT SERPL-CCNC: 17 U/L (ref 12–78)
ANION GAP SERPL CALC-SCNC: 6 MMOL/L (ref 2–12)
APPEARANCE UR: ABNORMAL
AST SERPL-CCNC: 20 U/L (ref 15–37)
BACTERIA SPEC CULT: NORMAL
BACTERIA URNS QL MICRO: ABNORMAL /HPF
BASOPHILS # BLD: 0.03 K/UL (ref 0–0.1)
BASOPHILS NFR BLD: 0.3 % (ref 0–1)
BILIRUB SERPL-MCNC: 0.8 MG/DL (ref 0.2–1)
BILIRUB UR QL: NEGATIVE
BUN SERPL-MCNC: 20 MG/DL (ref 6–20)
BUN/CREAT SERPL: 20 (ref 12–20)
CALCIUM SERPL-MCNC: 8.4 MG/DL (ref 8.5–10.1)
CC UR VC: NORMAL
CHLORIDE SERPL-SCNC: 111 MMOL/L (ref 97–108)
CO2 SERPL-SCNC: 23 MMOL/L (ref 21–32)
COLOR UR: ABNORMAL
CREAT SERPL-MCNC: 0.99 MG/DL (ref 0.7–1.3)
DIFFERENTIAL METHOD BLD: ABNORMAL
EKG ATRIAL RATE: 86 BPM
EKG DIAGNOSIS: NORMAL
EKG P AXIS: -17 DEGREES
EKG P-R INTERVAL: 164 MS
EKG Q-T INTERVAL: 380 MS
EKG QRS DURATION: 100 MS
EKG QTC CALCULATION (BAZETT): 454 MS
EKG R AXIS: -63 DEGREES
EKG T AXIS: 17 DEGREES
EKG VENTRICULAR RATE: 86 BPM
EOSINOPHIL # BLD: 0.03 K/UL (ref 0–0.4)
EOSINOPHIL NFR BLD: 0.3 % (ref 0–7)
EPITH CASTS URNS QL MICRO: ABNORMAL /LPF
ERYTHROCYTE [DISTWIDTH] IN BLOOD BY AUTOMATED COUNT: 13.2 % (ref 11.5–14.5)
ETHANOL SERPL-MCNC: <10 MG/DL (ref 0–0.08)
GLOBULIN SER CALC-MCNC: 2.7 G/DL (ref 2–4)
GLUCOSE SERPL-MCNC: 87 MG/DL (ref 65–100)
GLUCOSE UR STRIP.AUTO-MCNC: NEGATIVE MG/DL
HCT VFR BLD AUTO: 39.3 % (ref 36.6–50.3)
HGB BLD-MCNC: 12.4 G/DL (ref 12.1–17)
HGB UR QL STRIP: NEGATIVE
IMM GRANULOCYTES # BLD AUTO: 0.04 K/UL (ref 0–0.04)
IMM GRANULOCYTES NFR BLD AUTO: 0.5 % (ref 0–0.5)
KETONES UR QL STRIP.AUTO: 15 MG/DL
LACTATE SERPL-SCNC: 1 MMOL/L (ref 0.4–2)
LEUKOCYTE ESTERASE UR QL STRIP.AUTO: NEGATIVE
LIPASE SERPL-CCNC: 41 U/L (ref 13–75)
LYMPHOCYTES # BLD: 1.23 K/UL (ref 0.8–3.5)
LYMPHOCYTES NFR BLD: 13.9 % (ref 12–49)
MCH RBC QN AUTO: 28.3 PG (ref 26–34)
MCHC RBC AUTO-ENTMCNC: 31.6 G/DL (ref 30–36.5)
MCV RBC AUTO: 89.7 FL (ref 80–99)
MONOCYTES # BLD: 0.58 K/UL (ref 0–1)
MONOCYTES NFR BLD: 6.6 % (ref 5–13)
NEUTS SEG # BLD: 6.91 K/UL (ref 1.8–8)
NEUTS SEG NFR BLD: 78.4 % (ref 32–75)
NITRITE UR QL STRIP.AUTO: NEGATIVE
NRBC # BLD: 0 K/UL (ref 0–0.01)
NRBC BLD-RTO: 0 PER 100 WBC
PH UR STRIP: 5 (ref 5–8)
PLATELET # BLD AUTO: 191 K/UL (ref 150–400)
PMV BLD AUTO: 10.1 FL (ref 8.9–12.9)
POTASSIUM SERPL-SCNC: 3.5 MMOL/L (ref 3.5–5.1)
PROT SERPL-MCNC: 5.8 G/DL (ref 6.4–8.2)
PROT UR STRIP-MCNC: 300 MG/DL
RBC # BLD AUTO: 4.38 M/UL (ref 4.1–5.7)
RBC #/AREA URNS HPF: ABNORMAL /HPF (ref 0–5)
SERVICE CMNT-IMP: NORMAL
SODIUM SERPL-SCNC: 140 MMOL/L (ref 136–145)
SP GR UR REFRACTOMETRY: >1.03
SPECIMEN HOLD: NORMAL
UROBILINOGEN UR QL STRIP.AUTO: 1 EU/DL (ref 0.2–1)
WBC # BLD AUTO: 8.8 K/UL (ref 4.1–11.1)
WBC URNS QL MICRO: ABNORMAL /HPF (ref 0–4)

## 2025-07-12 PROCEDURE — 94760 N-INVAS EAR/PLS OXIMETRY 1: CPT

## 2025-07-12 PROCEDURE — 74177 CT ABD & PELVIS W/CONTRAST: CPT

## 2025-07-12 PROCEDURE — 83605 ASSAY OF LACTIC ACID: CPT

## 2025-07-12 PROCEDURE — 2580000003 HC RX 258: Performed by: FAMILY MEDICINE

## 2025-07-12 PROCEDURE — 70450 CT HEAD/BRAIN W/O DYE: CPT

## 2025-07-12 PROCEDURE — 87040 BLOOD CULTURE FOR BACTERIA: CPT

## 2025-07-12 PROCEDURE — 6360000002 HC RX W HCPCS: Performed by: EMERGENCY MEDICINE

## 2025-07-12 PROCEDURE — 96374 THER/PROPH/DIAG INJ IV PUSH: CPT

## 2025-07-12 PROCEDURE — 6360000002 HC RX W HCPCS: Performed by: STUDENT IN AN ORGANIZED HEALTH CARE EDUCATION/TRAINING PROGRAM

## 2025-07-12 PROCEDURE — 2060000000 HC ICU INTERMEDIATE R&B

## 2025-07-12 PROCEDURE — 2500000003 HC RX 250 WO HCPCS: Performed by: FAMILY MEDICINE

## 2025-07-12 PROCEDURE — 2500000003 HC RX 250 WO HCPCS: Performed by: EMERGENCY MEDICINE

## 2025-07-12 PROCEDURE — 6360000004 HC RX CONTRAST MEDICATION: Performed by: STUDENT IN AN ORGANIZED HEALTH CARE EDUCATION/TRAINING PROGRAM

## 2025-07-12 PROCEDURE — 93010 ELECTROCARDIOGRAM REPORT: CPT | Performed by: INTERNAL MEDICINE

## 2025-07-12 PROCEDURE — 80053 COMPREHEN METABOLIC PANEL: CPT

## 2025-07-12 PROCEDURE — 85025 COMPLETE CBC W/AUTO DIFF WBC: CPT

## 2025-07-12 PROCEDURE — 99222 1ST HOSP IP/OBS MODERATE 55: CPT | Performed by: SURGERY

## 2025-07-12 PROCEDURE — 74176 CT ABD & PELVIS W/O CONTRAST: CPT

## 2025-07-12 RX ORDER — METOPROLOL SUCCINATE 25 MG/1
25 TABLET, EXTENDED RELEASE ORAL DAILY
Status: DISCONTINUED | OUTPATIENT
Start: 2025-07-12 | End: 2025-07-12

## 2025-07-12 RX ORDER — SODIUM CHLORIDE 0.9 % (FLUSH) 0.9 %
5-40 SYRINGE (ML) INJECTION PRN
Status: DISCONTINUED | OUTPATIENT
Start: 2025-07-12 | End: 2025-07-18 | Stop reason: HOSPADM

## 2025-07-12 RX ORDER — ACETAMINOPHEN 650 MG/1
650 SUPPOSITORY RECTAL EVERY 6 HOURS PRN
Status: DISCONTINUED | OUTPATIENT
Start: 2025-07-12 | End: 2025-07-18 | Stop reason: HOSPADM

## 2025-07-12 RX ORDER — SODIUM CHLORIDE 9 MG/ML
INJECTION, SOLUTION INTRAVENOUS CONTINUOUS
Status: DISCONTINUED | OUTPATIENT
Start: 2025-07-12 | End: 2025-07-13

## 2025-07-12 RX ORDER — ONDANSETRON 2 MG/ML
4 INJECTION INTRAMUSCULAR; INTRAVENOUS EVERY 6 HOURS PRN
Status: DISCONTINUED | OUTPATIENT
Start: 2025-07-12 | End: 2025-07-18 | Stop reason: HOSPADM

## 2025-07-12 RX ORDER — SODIUM CHLORIDE 0.9 % (FLUSH) 0.9 %
5-40 SYRINGE (ML) INJECTION EVERY 12 HOURS SCHEDULED
Status: DISCONTINUED | OUTPATIENT
Start: 2025-07-12 | End: 2025-07-18 | Stop reason: HOSPADM

## 2025-07-12 RX ORDER — PANTOPRAZOLE SODIUM 40 MG/1
40 TABLET, DELAYED RELEASE ORAL
Status: DISCONTINUED | OUTPATIENT
Start: 2025-07-13 | End: 2025-07-18 | Stop reason: HOSPADM

## 2025-07-12 RX ORDER — HALOPERIDOL 5 MG/ML
2.5 INJECTION INTRAMUSCULAR EVERY 6 HOURS PRN
Status: DISCONTINUED | OUTPATIENT
Start: 2025-07-12 | End: 2025-07-18 | Stop reason: HOSPADM

## 2025-07-12 RX ORDER — ACETAMINOPHEN 325 MG/1
650 TABLET ORAL EVERY 6 HOURS PRN
Status: DISCONTINUED | OUTPATIENT
Start: 2025-07-12 | End: 2025-07-18 | Stop reason: HOSPADM

## 2025-07-12 RX ORDER — ONDANSETRON 4 MG/1
4 TABLET, ORALLY DISINTEGRATING ORAL EVERY 8 HOURS PRN
Status: DISCONTINUED | OUTPATIENT
Start: 2025-07-12 | End: 2025-07-18 | Stop reason: HOSPADM

## 2025-07-12 RX ORDER — 0.9 % SODIUM CHLORIDE 0.9 %
1000 INTRAVENOUS SOLUTION INTRAVENOUS ONCE
Status: COMPLETED | OUTPATIENT
Start: 2025-07-12 | End: 2025-07-12

## 2025-07-12 RX ORDER — SODIUM CHLORIDE 9 MG/ML
INJECTION, SOLUTION INTRAVENOUS PRN
Status: DISCONTINUED | OUTPATIENT
Start: 2025-07-12 | End: 2025-07-18 | Stop reason: HOSPADM

## 2025-07-12 RX ORDER — SODIUM CHLORIDE 9 MG/ML
INJECTION, SOLUTION INTRAVENOUS CONTINUOUS
Status: DISCONTINUED | OUTPATIENT
Start: 2025-07-12 | End: 2025-07-12

## 2025-07-12 RX ADMIN — SODIUM CHLORIDE, PRESERVATIVE FREE 10 ML: 5 INJECTION INTRAVENOUS at 10:00

## 2025-07-12 RX ADMIN — SODIUM CHLORIDE: 0.9 INJECTION, SOLUTION INTRAVENOUS at 11:42

## 2025-07-12 RX ADMIN — SODIUM CHLORIDE 1000 ML: 0.9 INJECTION, SOLUTION INTRAVENOUS at 04:32

## 2025-07-12 RX ADMIN — SODIUM CHLORIDE, PRESERVATIVE FREE 10 ML: 5 INJECTION INTRAVENOUS at 22:00

## 2025-07-12 RX ADMIN — SODIUM CHLORIDE: 0.9 INJECTION, SOLUTION INTRAVENOUS at 05:01

## 2025-07-12 RX ADMIN — IOPAMIDOL 100 ML: 755 INJECTION, SOLUTION INTRAVENOUS at 01:12

## 2025-07-12 RX ADMIN — HALOPERIDOL LACTATE 2.5 MG: 5 INJECTION, SOLUTION INTRAMUSCULAR at 11:38

## 2025-07-12 RX ADMIN — SODIUM CHLORIDE: 0.9 INJECTION, SOLUTION INTRAVENOUS at 04:10

## 2025-07-12 RX ADMIN — WATER 1000 MG: 1 INJECTION INTRAMUSCULAR; INTRAVENOUS; SUBCUTANEOUS at 01:36

## 2025-07-12 ASSESSMENT — PAIN SCALES - GENERAL: PAINLEVEL_OUTOF10: 0

## 2025-07-12 NOTE — ED NOTES
NG tube placed to R nare, 200mL of dark brown, foul smelling fluid pulled from tube. Pt then proceeded to immediately pull out the tube. MD notified and states there is no current need to place the NG tube a second time.

## 2025-07-12 NOTE — ED NOTES
Pt continuing to attempt to get out of bed. When asked why, pt states he'd going \"to the mens room\". Nurse explained to pt that he has on a malewick and can be put on a bedpan if needed. He verbalizes understanding and states, \"do what you have to do.\" Nurse reiterated the use of the call bell to pt and he states \"yep.\"

## 2025-07-12 NOTE — PROGRESS NOTES
[facility]                                                                                          Hospitalist Progress Note  Bobby Huggins MD  Office Phone: [phone]        Date of Service:  2025  NAME:  Oleg Ren Jr  :  1945  MRN:  [id number]       Admission Summary:   Oleg Ren Jr is a 79 y.o. male with past medical history of cancer of ascending colon, laparoscopic partial colectomy with resection of terminal ileum 2010, laparoscopic cholecystectomy, hypertension, hyperlipidemia, anemia, tinnitus presented to the ED via EMS from home with chief complaint of nausea, vomiting, hypertension.    Exact onset of symptoms is not known.  Patient report is a limited historian.  Majority of history is obtained per review of ED and electronic medical records.  Per ER reports, patient's family had noted that patient was refusing to take medications and refusing to eat food.  EMS was called to the scene and transported patient to the ED.  On arrival in the ED, initial recorded vital signs were temperature 97.9 °F, /89, heart rate 86, respiratory rate 18, O2 saturation 99% on room air.  12-lead EKG showed sinus rhythm, premature supraventricular complexes, frequent PVCs, incomplete RBBB, ST/T wave changes in the septal leads 86 bpm.  Abnormal labs included WBC 15.8, neutrophils 90.2%, BUN 24, GFR 56, , calcium 10.5.  UA showed 15 ketones, negative blood, negative glucose, 300 protein, negative nitrite, negative leukocyte esterase, 0-4 WBC, 0-5 RBC, 3+ bacteria.  CT head without IV contrast showed cerebral atrophy, white matter changes most compatible chronic small vessel ischemic and/or senescent change, intracranial atherosclerosis but no acute intracranial hemorrhage, mass, infarct.  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

## 2025-07-12 NOTE — ED PROVIDER NOTES
Patient signed out to me by Dr. Whitehead at 11 PM pending results of CT imaging.  Patient now resting comfortably.  CT scan shows concern for bowel obstruction.  Discussed with general surgery who recommend NG tube and hospitalist admission.  They will evaluate further in the morning to discuss surgical options.    Perfect Serve Consult for Admission  2:43 AM    ED Room Number: ER12/12  Patient Name and age:  Oleg Ren Jr 79 y.o.  male  Working Diagnosis:   1. Nausea and vomiting, unspecified vomiting type    2. Abdominal pain, unspecified abdominal location    3. Failure to thrive in adult    4. Delirium    5. Leukocytosis, unspecified type    6. Intestinal obstruction, unspecified cause, unspecified whether partial or complete (HCC)        COVID-19 Suspicion: No  Sepsis present:  No  Reassessment needed: No  Code Status:  Full Code  Readmission: No  Isolation Requirements: no  Recommended Level of Care: med/surg  Department: [facility] Adult ED - [phone]  Consulting Provider:     Other:  pt from home with vomiting, not taking meds, concerns for failure to thrive.  Has leukocytosis with bacteria in urine.  CT a/p showing bowel obstruction.  Pt resting comfortably.  VS stable.  No vomiting in ED.  Discussed with general surgery who rec'd NGT.  Will eval in morning.       Total critical care time spent exclusive of procedures:  35 minutes.        Shayan Alonso MD  07/12/25 0244

## 2025-07-12 NOTE — ED PROVIDER NOTES
HISTORY OF PRESENT ILLNESS  79-year-old male with a history of anemia and colon cancer, currently prescribed atorvastatin, hydrochlorothiazide, metoprolol, and valsartan, was brought in by EMS. History obtained from the patient, family, EMS, and chart. He presents with nausea without vomiting and was advised to come to the Emergency Department by his daughter due to elevated blood pressure and refusal to take medications or eat at home. Family reports ongoing medication and food refusal. EMS recorded a blood glucose of 135 mg/dL. He denies chest pain, shortness of breath, dizziness, lightheadedness, fevers, or chills.    PAST MEDICAL HISTORY  - Anemia  - Colon cancer    RELEVANT SOCIAL DETERMINANTS OF HEALTH  - Patient is refusing to take medications and refusing to eat at home, which may negatively impact his health outcomes.    PHYSICAL EXAM  Vitals: Interpreted as normal for this patient.  General: Extremely disheveled and unkempt.  Eyes: Appear normal with no scleral icterus.  HENT: Atraumatic. Moist mucous membranes, no pharyngeal erythema, edema or lesions.  Neck: Atraumatic, supple.  Cardiac: Regular rate, regular rhythm, no significant murmurs appreciated.  Respiratory: No respiratory distress, clear lungs bilaterally with no abnormal breath sounds.  Abdomen: Mild abdominal tenderness.  : No CVAT.  MS: Extremities atraumatic. No edema, no calf tenderness to palpation.  Skin: Pale.  Back exam: Atraumatic.  Neurologic: No altered mental status, speech is fluent. Gait is within normal limits. No cerebellar deficits. No motor deficits. No sensory deficits.  Psychological: Extremely withdrawn depressed appearing    SUMMARY:  79-year-old male presented via EMS with nausea and elevated blood pressure. History limited; collateral from family revealed medication and food refusal at home. Exam notable for extreme dishevelment, mild abdominal tenderness, and pallor. Leukocytosis identified. Case management consulted  patient’s clinical instability, abnormal laboratory findings, and unsafe home environment necessitate hospital admission for further evaluation and management. The case was signed out to Dr. Alonso for continued care.    DIFFERENTIAL DIAGNOSES  - Gastroenteritis: Considered due to nausea and mild abdominal tenderness; less likely as patient denies vomiting, fevers, or chills.  - Small bowel obstruction: Considered given abdominal tenderness and history of malignancy; less likely as no vomiting or severe abdominal distension reported.  - Medication noncompliance with hypertensive urgency: Considered due to refusal of medications and elevated blood pressure; less likely as no acute end-organ damage symptoms present.  - Urinary tract infection: Considered given leukocytosis and age; less likely as no dysuria, urinary frequency, or suprapubic tenderness reported.  - Acute metabolic encephalopathy: Considered due to altered behavior and restlessness; less likely as no documented hypoxia, hypercapnia, or severe metabolic derangements.  - Elder abuse/neglect: Considered due to extreme dishevelment, withdrawal, and refusal to eat or take medications; less likely as no clear evidence of trauma or acute injury.  - Malignancy-related complication: Considered given history of colon cancer and anemia; less likely as no acute bleeding or obstructive symptoms documented.  Working list subject to change after further testing.    DISPOSITION  Inpatient: Patient being signed out to Dr. Alonso, ER physician, for further management; patient will likely require admission.  This patient requires inpatient admission due to clinical instability, inability to provide reliable history, abnormal laboratory findings, and unsafe home environment with medication and food refusal. The patient’s presentation and social situation necessitate close monitoring, further diagnostic evaluation, and multidisciplinary care that cannot be safely provided in

## 2025-07-12 NOTE — CARE COORDINATION
Care Management - Received hand off from ED case manager working with patient yesterday.     Reviewed chart. Patient is being admitted for bowel obstruction.     09:53 Called wife Bere Ren's home number at [phone]. Step-daughter Barbi Salguero answered the phone. Explained patient has been admitted and is now in room 206. Gave her the number to the floor.       RONNIE BLANCAS

## 2025-07-12 NOTE — ED NOTES
Pt and bed cleaned linens changed pt in bed with new warm blankets resting comfortably. NAD, breathing even and unlabored

## 2025-07-12 NOTE — H&P
History and Physical    Date of Service:  7/12/2025  Primary Care Provider: Alex Russell MD  Source of information: Information is unobtainable from patient due to the patient's physical and/or mental condition at the time of admission    Chief Complaint: Nausea and Vomiting      History of Presenting Illness:   Oleg Ren Jr is a 79 y.o. male with past medical history of cancer of ascending colon, laparoscopic partial colectomy with resection of terminal ileum 9/2/2010, laparoscopic cholecystectomy, hypertension, hyperlipidemia, anemia, tinnitus presented to the ED via EMS from home with chief complaint of nausea, vomiting, hypertension.    Exact onset of symptoms is not known.  Patient report is a limited historian.  Majority of history is obtained per review of ED and electronic medical records.  Per ER reports, patient's family had noted that patient was refusing to take medications and refusing to eat food.  EMS was called to the scene and transported patient to the ED.  On arrival in the ED, initial recorded vital signs were temperature 97.9 °F, /89, heart rate 86, respiratory rate 18, O2 saturation 99% on room air.  12-lead EKG showed sinus rhythm, premature supraventricular complexes, frequent PVCs, incomplete RBBB, ST/T wave changes in the septal leads 86 bpm.  Abnormal labs included WBC 15.8, neutrophils 90.2%, BUN 24, GFR 56, , calcium 10.5.  UA showed 15 ketones, negative blood, negative glucose, 300 protein, negative nitrite, negative leukocyte esterase, 0-4 WBC, 0-5 RBC, 3+ bacteria.  CT head without IV contrast showed cerebral atrophy, white matter changes most compatible chronic small vessel ischemic and/or senescent change, intracranial atherosclerosis but no acute intracranial hemorrhage, mass, infarct.  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

## 2025-07-12 NOTE — ED NOTES
ED TO INPATIENT SBAR HANDOFF    Patient Name: Oleg Ren Jr   :  1945  79 y.o.   MRN:  [id number]  ED Room #:  ER12/12     Situation  Code Status: Full Code   Allergies: Patient has no known allergies.  Weight: Patient Vitals for the past 96 hrs (Last 3 readings):   Weight   25 1536 60.2 kg (132 lb 11.5 oz)       Arrived from: home    Chief Complaint:   Chief Complaint   Patient presents with    Nausea    Vomiting       Hospital Problem/Diagnosis:  Principal Problem:    Bowel obstruction (HCC)  Resolved Problems:    * No resolved hospital problems. *      Mobility: limited transfer mobility   ED Fall Risk: Presents to emergency department  because of falls (Syncope, seizure, or loss of consciousness): No, Age > 70: Yes, Altered Mental Status, Intoxication with alcohol or substance confusion (Disorientation, impaired judgment, poor safety awaremess, or inability to follow instructions): Yes, Impaired Mobility: Ambulates or transfers with assistive devices or assistance; Unable to ambulate or transer.: Yes, Nursing Judgement: Yes   Fell in ED or prior to admission: no   Restraints: no     Sitter: yes   Family/Caregiver Present: no    Neet to know social/safety information: Pt will kick, bite, punch    Background  History:   Past Medical History:   Diagnosis Date    Anemia     Cancer (HCC)     Cancer of ascending colon (HCC) 2010    Carcinoma in situ of colon     Chronic cholecystitis     Follow-up examination, following unspecified surgery 2010    Ringing in ears     Wears glasses        Assessment    Abnormal Assessment Findings: SBO, failure to thrive, unable to care for himself family does not provide care   Imaging:   CT ABDOMEN PELVIS W IV CONTRAST Additional Contrast? None   Final Result   Probable postsurgical adhesive small bowel and closed-loop colon   obstruction with small ascites. Incidentals as above.      Electronically signed by Zafar Rios      CT HEAD WO CONTRAST

## 2025-07-12 NOTE — PROGRESS NOTES
4 Eyes Skin Assessment     NAME:  Oleg Ren Jr  YOB: 1945  MEDICAL RECORD NUMBER:  [id number]    The patient is being assessed for  Admission    I agree that at least one RN has performed a thorough Head to Toe Skin Assessment on the patient. ALL assessment sites listed below have been assessed.      Areas assessed by both nurses:    Head, Face, Ears, Shoulders, Back, Chest, Arms, Elbows, Hands, Sacrum. Buttock, Coccyx, Ischium, and Legs. Feet and Heels        Does the Patient have a Wound? Yes wound(s) were present on assessment. LDA wound assessment was Initiated and completed by RN       Faisal Prevention initiated by RN: Yes  Wound Care Orders initiated by RN: No    Pressure Injury (Stage 1,2,3,4, Unstageable, DTI, NWPT, and Complex wounds) if present, place Wound referral order by RN under : No    New Ostomies, if present place, Ostomy referral order under : No     Nurse 1 eSignature: Electronically signed by Esther Arce RN on 7/12/25 at 6:19 PM EDT    **SHARE this note so that the co-signing nurse can place an eSignature**    Nurse 2 eSignature: Electronically signed by Eun Mcintyre RN on 7/12/25 at 6:20 PM EDT

## 2025-07-12 NOTE — ED NOTES
Someone came on over virtual sitter and states they were given the wrong camera number and that's why they hadn't been able to start monitoring pt yet. She states someone should be on in about 5 minutes. Pt is uncooperative with wearing VS monitoring equipment. He is alert and oriented to person, but nothing else at this time. He does verbalize understanding about not getting out of bed and to use the call bell if he needs something, however when nurse asked pt \"what do you do if you need something?\" Pt states \"do what you gotta do.\" Pt was re-educated to use call bell and he nodded his head. Sitter came on again around 2006 and introduced herself and states she will be starting as the virtual  at this time. Pt repositioned in bed, but is refusing to wear a gown at this time. Blankets applied and pt states he is comfortable. NAD noted and no c/o voiced.

## 2025-07-12 NOTE — PLAN OF CARE
Problem: Discharge Planning  Goal: Discharge to home or other facility with appropriate resources  Outcome: Progressing     Problem: Pain  Goal: Verbalizes/displays adequate comfort level or baseline comfort level  Outcome: Progressing     Problem: Safety - Medical Restraint  Goal: Remains free of injury from restraints (Restraint for Interference with Medical Device)  Description: INTERVENTIONS:  1. Determine that other, less restrictive measures have been tried or would not be effective before applying the restraint  2. Evaluate the patient's condition at the time of restraint application  3. Inform patient/family regarding the reason for restraint  4. Q2H: Monitor safety, psychosocial status, comfort, nutrition and hydration  Outcome: Progressing     Problem: Safety - Adult  Goal: Free from fall injury  Outcome: Progressing

## 2025-07-12 NOTE — ED NOTES
Pt repositioned back in bed and incontinence care provided due to pt pulling off the malewick resulting in his brief becoming soiled. Pt is resting comfortably at this time.

## 2025-07-12 NOTE — CONSULTS
General Surgery Consult Note            Date:7/12/2025        Patient Name:Oleg Ren Jr     YOB: 1945     Age:79 y.o.    Reason for Consult: Bowel obstruction        History of Present Illness   History obtained from patient's chart, in speaking with his wife.  The patient is an 79-year-old male who presented complaining of nausea vomiting and hypertension.  It is unclear exactly how long this has been going on.  Patient will not answer any questions besides saying no.  According to his wife he has been not eating all that much over the past several months.  The patient has been seen by Adult Protective Services and apparently has been quite noncompliant with all of his medications or any care.  Patient has a history of a right hemicolectomy and cholecystectomy back in 2010 for cancer.  From our records there has been limited follow-up from this and no repeat imaging since 2010.    Past Medical History     Past Medical History:   Diagnosis Date    Anemia     Cancer (HCC)     Cancer of ascending colon (HCC) 9/17/2010    Carcinoma in situ of colon     Chronic cholecystitis     Follow-up examination, following unspecified surgery 9/17/2010    Ringing in ears     Wears glasses         Past Surgical History     Past Surgical History:   Procedure Laterality Date    LAP,CHOLECYSTECTOMY  9/2/10    LAP,SURG,COLECTOMY,W/REMVL TERM ILEUM  9/2/10        Medications     Prior to Admission medications    Medication Sig Start Date End Date Taking? Authorizing Provider   acyclovir (ZOVIRAX) 800 MG tablet Take 800 mg by mouth 2 times daily    Automatic Reconciliation, Ar   atorvastatin (LIPITOR) 40 MG tablet Take by mouth daily    Automatic Reconciliation, Ar   cloNIDine (CATAPRES) 0.1 MG tablet ceived the following from [facility]: Outside name: clonidine (CATAPRES) 0.1 mg tablet 9/8/10   Automatic Reconciliation, Ar   erythromycin (ROMYCIN) 5 MG/GM ophthalmic ointment Apply 1 ribbon of gel

## 2025-07-12 NOTE — ED NOTES
Pt continuing to attempt to get out of bed and is being redirected and repositioned in bed by nursing staff. When asked where he's going, pt states \"I want to get out of here.\" Pt informed of current plan of care including upcoming CT scan. Pt states \"okay.\" He repositioned himself to his L side and is resting comfortably at this time. Will notify CT once Haldol has taken effect and pt is ready for the scan.

## 2025-07-13 ENCOUNTER — APPOINTMENT (OUTPATIENT)
Facility: HOSPITAL | Age: 80
End: 2025-07-13
Payer: MEDICARE

## 2025-07-13 LAB
ALBUMIN SERPL-MCNC: 2.9 G/DL (ref 3.5–5)
ALBUMIN/GLOB SERPL: 1.2 (ref 1.1–2.2)
ALP SERPL-CCNC: 63 U/L (ref 45–117)
ALT SERPL-CCNC: 12 U/L (ref 12–78)
ANION GAP SERPL CALC-SCNC: 6 MMOL/L (ref 2–12)
AST SERPL-CCNC: 16 U/L (ref 15–37)
BASOPHILS # BLD: 0.06 K/UL (ref 0–0.1)
BASOPHILS NFR BLD: 0.8 % (ref 0–1)
BILIRUB SERPL-MCNC: 0.6 MG/DL (ref 0.2–1)
BUN SERPL-MCNC: 17 MG/DL (ref 6–20)
BUN/CREAT SERPL: 17 (ref 12–20)
CALCIUM SERPL-MCNC: 8.3 MG/DL (ref 8.5–10.1)
CHLORIDE SERPL-SCNC: 113 MMOL/L (ref 97–108)
CO2 SERPL-SCNC: 23 MMOL/L (ref 21–32)
CREAT SERPL-MCNC: 0.98 MG/DL (ref 0.7–1.3)
DIFFERENTIAL METHOD BLD: ABNORMAL
EOSINOPHIL # BLD: 0.12 K/UL (ref 0–0.4)
EOSINOPHIL NFR BLD: 1.6 % (ref 0–7)
ERYTHROCYTE [DISTWIDTH] IN BLOOD BY AUTOMATED COUNT: 13.2 % (ref 11.5–14.5)
GLOBULIN SER CALC-MCNC: 2.4 G/DL (ref 2–4)
GLUCOSE SERPL-MCNC: 74 MG/DL (ref 65–100)
HCT VFR BLD AUTO: 35.7 % (ref 36.6–50.3)
HGB BLD-MCNC: 11.3 G/DL (ref 12.1–17)
IMM GRANULOCYTES # BLD AUTO: 0.02 K/UL (ref 0–0.04)
IMM GRANULOCYTES NFR BLD AUTO: 0.3 % (ref 0–0.5)
LYMPHOCYTES # BLD: 1.72 K/UL (ref 0.8–3.5)
LYMPHOCYTES NFR BLD: 23.3 % (ref 12–49)
MAGNESIUM SERPL-MCNC: 1.8 MG/DL (ref 1.6–2.4)
MCH RBC QN AUTO: 28.8 PG (ref 26–34)
MCHC RBC AUTO-ENTMCNC: 31.7 G/DL (ref 30–36.5)
MCV RBC AUTO: 90.8 FL (ref 80–99)
MONOCYTES # BLD: 0.58 K/UL (ref 0–1)
MONOCYTES NFR BLD: 7.8 % (ref 5–13)
NEUTS SEG # BLD: 4.89 K/UL (ref 1.8–8)
NEUTS SEG NFR BLD: 66.2 % (ref 32–75)
NRBC # BLD: 0 K/UL (ref 0–0.01)
NRBC BLD-RTO: 0 PER 100 WBC
PLATELET # BLD AUTO: 168 K/UL (ref 150–400)
PMV BLD AUTO: 10.3 FL (ref 8.9–12.9)
POTASSIUM SERPL-SCNC: 3.3 MMOL/L (ref 3.5–5.1)
PROT SERPL-MCNC: 5.3 G/DL (ref 6.4–8.2)
RBC # BLD AUTO: 3.93 M/UL (ref 4.1–5.7)
SODIUM SERPL-SCNC: 142 MMOL/L (ref 136–145)
WBC # BLD AUTO: 7.4 K/UL (ref 4.1–11.1)

## 2025-07-13 PROCEDURE — 1100000000 HC RM PRIVATE

## 2025-07-13 PROCEDURE — 83735 ASSAY OF MAGNESIUM: CPT

## 2025-07-13 PROCEDURE — 2500000003 HC RX 250 WO HCPCS: Performed by: FAMILY MEDICINE

## 2025-07-13 PROCEDURE — 99231 SBSQ HOSP IP/OBS SF/LOW 25: CPT | Performed by: SURGERY

## 2025-07-13 PROCEDURE — 80053 COMPREHEN METABOLIC PANEL: CPT

## 2025-07-13 PROCEDURE — 85025 COMPLETE CBC W/AUTO DIFF WBC: CPT

## 2025-07-13 PROCEDURE — 74018 RADEX ABDOMEN 1 VIEW: CPT

## 2025-07-13 RX ADMIN — SODIUM CHLORIDE, PRESERVATIVE FREE 10 ML: 5 INJECTION INTRAVENOUS at 20:38

## 2025-07-13 NOTE — PROGRESS NOTES
[facility]                                                                                          Hospitalist Progress Note  Bobby Huggins MD  Office Phone: [phone]        Date of Service:  2025  NAME:  Oleg Ren Jr  :  1945  MRN:  [id number]       Admission Summary:   Oleg Ren Jr is a 79 y.o. male with past medical history of cancer of ascending colon, laparoscopic partial colectomy with resection of terminal ileum 2010, laparoscopic cholecystectomy, hypertension, hyperlipidemia, anemia, tinnitus presented to the ED via EMS from home with chief complaint of nausea, vomiting, hypertension.    Exact onset of symptoms is not known.  Patient report is a limited historian.  Majority of history is obtained per review of ED and electronic medical records.  Per ER reports, patient's family had noted that patient was refusing to take medications and refusing to eat food.  EMS was called to the scene and transported patient to the ED.  On arrival in the ED, initial recorded vital signs were temperature 97.9 °F, /89, heart rate 86, respiratory rate 18, O2 saturation 99% on room air.  12-lead EKG showed sinus rhythm, premature supraventricular complexes, frequent PVCs, incomplete RBBB, ST/T wave changes in the septal leads 86 bpm.  Abnormal labs included WBC 15.8, neutrophils 90.2%, BUN 24, GFR 56, , calcium 10.5.  UA showed 15 ketones, negative blood, negative glucose, 300 protein, negative nitrite, negative leukocyte esterase, 0-4 WBC, 0-5 RBC, 3+ bacteria.  CT head without IV contrast showed cerebral atrophy, white matter changes most compatible chronic small vessel ischemic and/or senescent change, intracranial atherosclerosis but no acute intracranial hemorrhage, mass, infarct.  CT abdomen pelvis with IV contrast showed probable postsurgical adhesive small bowel and closed-loop colon obstruction with small ascites; other  Stability: Not on file   Interpersonal Safety: Not on file   Utilities: Not on file       Review of Systems:   Pertinent items are noted in HPI.       Vital Signs:    Last 24hrs VS reviewed since prior progress note. Most recent are:  Vitals:    07/13/25 1000   BP:    Pulse: 64   Resp:    Temp:    SpO2:          Intake/Output Summary (Last 24 hours) at 7/13/2025 1409  Last data filed at 7/13/2025 0000  Gross per 24 hour   Intake --   Output 550 ml   Net -550 ml        Physical Examination:     I had a face to face encounter with this patient and independently examined them on 7/13/2025 as outlined below:          General : AO x 1 awake, no acute distress,  HEENT: PEERL, EOMI, moist mucus membrane, TM clear  Neck: supple, no JVD, no meningeal signs  Chest: Clear to auscultation bilaterally   CVS: S1 S2 heard, Capillary refill less than 2 seconds  Abd: soft/ non tender, non distended, BS physiological,   Ext: no clubbing, no cyanosis, no edema, brisk 2+ DP pulses  Neuro/Psych: Confused, CN 2-12 grossly intact, sensory grossly within normal limit, Strength 5/5 in all extremities, DTR 1+ x 4  Skin: warm     Data Review:    Review and/or order of clinical lab test  Review and/or order of tests in the radiology section of CPT  Review and/or order of tests in the medicine section of CPT      I have personally and independently reviewed all pertinent labs, diagnostic studies, imaging, and have provided independent interpretation of the same.     Labs:     Recent Labs     07/12/25  1206 07/13/25  0640   WBC 8.8 7.4   HGB 12.4 11.3*   HCT 39.3 35.7*    168     Recent Labs     07/11/25  1524 07/12/25  1206 07/13/25  0640    140 142   K 3.8 3.5 3.3*    111* 113*   CO2 23 23 23   BUN 24* 20 17   MG  --   --  1.8     Recent Labs     07/11/25  1524 07/12/25  1206 07/13/25  0640   ALT 22 17 12   GLOB 3.8 2.7 2.4     No results for input(s): \"INR\", \"APTT\" in the last 72 hours.    Invalid input(s): \"PTP\"   No

## 2025-07-13 NOTE — PROGRESS NOTES
Progress Note  Date:2025       Room:[facility]  Patient Name:Oleg Ren Jr     YOB: 1945     Age:79 y.o.        Subjective    Subjective   Review of Systems  Patient without complaints today.  States he feels like he is back to normal.  Alert and calm.  Had a liquid bowel movement earlier.  Objective         Vitals Last 24 Hours:  TEMPERATURE:  Temp  Av.2 °F (36.8 °C)  Min: 97.9 °F (36.6 °C)  Max: 98.4 °F (36.9 °C)  RESPIRATIONS RANGE: Resp  Avg: 15.2  Min: 14  Max: 16  PULSE OXIMETRY RANGE: SpO2  Av.7 %  Min: 93 %  Max: 99 %  PULSE RANGE: Pulse  Av.7  Min: 57  Max: 85  BLOOD PRESSURE RANGE: Systolic (24hrs), Av , Min:115 , Max:177   ; Diastolic (24hrs), Av, Min:56, Max:76    I/O (24Hr):    Intake/Output Summary (Last 24 hours) at 2025 1514  Last data filed at 2025 0000  Gross per 24 hour   Intake --   Output 550 ml   Net -550 ml     Objective:  Vital signs: (most recent): Blood pressure (!) 150/56, pulse 73, temperature 98.2 °F (36.8 °C), temperature source Oral, resp. rate 16, height 1.626 m (5' 4\"), weight 59 kg (130 lb), SpO2 97%.    General alert no acute distress  Abdomen soft nontender nondistended  X-ray not yet read but appears improved  Labs/Imaging/Diagnostics    Labs:  CBC:  Recent Labs     25  1524 25  1206 25  0640   WBC 15.8* 8.8 7.4   RBC 4.97 4.38 3.93*   HGB 14.2 12.4 11.3*   HCT 43.5 39.3 35.7*   MCV 87.5 89.7 90.8   RDW 13.1 13.2 13.2    191 168     CHEMISTRIES:  Recent Labs     25  1524 25  1206 25  0640    140 142   K 3.8 3.5 3.3*    111* 113*   CO2 23 23 23   BUN 24* 20 17   CREATININE 1.30 0.99 0.98   GLUCOSE 147* 87 74   MG  --   --  1.8     PT/INR:No results for input(s): \"PROTIME\", \"INR\" in the last 72 hours.  APTT:No results for input(s): \"APTT\" in the last 72 hours.  LIVER PROFILE:  Recent Labs     25  1524 25  1206 25  0640   AST 21 20 16   ALT 22 17 12  Unremarkable. KIDNEYS: No mass, calculus, or hydronephrosis. STOMACH: Unremarkable. SMALL BOWEL: Fluid-filled distended and dilated proximal small bowel loops to a point of focal transition in the central abdomen (601/69). Surgical anastomotic suture line and surgical clips are seen in the right upper abdomen COLON: Sigmoid colon is dilated and distended with gas 2 points of transition in the central abdomen adjacent to the point of transition of small bowel with nondistended bowel. APPENDIX: Not seen and likely surgically absent. PERITONEUM: Small ascites. No pneumoperitoneum. RETROPERITONEUM: No lymphadenopathy or aortic aneurysm. REPRODUCTIVE ORGANS: Prostate and seminal vesicles are unremarkable URINARY BLADDER: No mass or calculus. BONES: No destructive bone lesion. ABDOMINAL WALL: No mass or hernia. ADDITIONAL COMMENTS: N/A     Probable postsurgical adhesive small bowel and closed-loop colon obstruction with small ascites. Incidentals as above. Electronically signed by Zafar Rios    CT HEAD WO CONTRAST  Result Date: 7/12/2025  EXAM: CT HEAD WO CONTRAST INDICATION: AMS COMPARISON: CT 8/6/2022. CONTRAST: None. TECHNIQUE: Unenhanced CT of the head was performed using 5 mm images. Brain and bone windows were generated. Coronal and sagittal reformats. CT dose reduction was achieved through use of a standardized protocol tailored for this examination and automatic exposure control for dose modulation. FINDINGS: There is no acute intracranial hemorrhage, mass, mass effect or herniation. Ventricles and sulci show proportionate and symmetric prominence. There is periventricular white matter hypodensity. The gray-white matter differentiation is well-preserved. Atherosclerotic calcifications are seen within the carotid siphons and vertebral arteries. The mastoid air cells are well pneumatized. The visualized paranasal sinuses are normal.     No acute intracranial hemorrhage, mass or infarct. Cerebral atrophy and

## 2025-07-13 NOTE — PLAN OF CARE
Problem: Safety - Adult  Goal: Free from fall injury  7/12/2025 2231 by Shaun Arreola, RN  Outcome: Progressing  7/12/2025 1718 by Esther Reeves, RN  Outcome: Progressing

## 2025-07-14 PROCEDURE — 6360000002 HC RX W HCPCS: Performed by: STUDENT IN AN ORGANIZED HEALTH CARE EDUCATION/TRAINING PROGRAM

## 2025-07-14 PROCEDURE — 97165 OT EVAL LOW COMPLEX 30 MIN: CPT

## 2025-07-14 PROCEDURE — 99232 SBSQ HOSP IP/OBS MODERATE 35: CPT | Performed by: SURGERY

## 2025-07-14 PROCEDURE — 2500000003 HC RX 250 WO HCPCS: Performed by: FAMILY MEDICINE

## 2025-07-14 PROCEDURE — 6370000000 HC RX 637 (ALT 250 FOR IP): Performed by: STUDENT IN AN ORGANIZED HEALTH CARE EDUCATION/TRAINING PROGRAM

## 2025-07-14 PROCEDURE — 1200000000 HC SEMI PRIVATE

## 2025-07-14 RX ORDER — OLANZAPINE 5 MG/1
5 TABLET, FILM COATED ORAL NIGHTLY
Status: DISCONTINUED | OUTPATIENT
Start: 2025-07-14 | End: 2025-07-18 | Stop reason: HOSPADM

## 2025-07-14 RX ADMIN — SODIUM CHLORIDE, PRESERVATIVE FREE 10 ML: 5 INJECTION INTRAVENOUS at 22:26

## 2025-07-14 RX ADMIN — HALOPERIDOL LACTATE 2.5 MG: 5 INJECTION, SOLUTION INTRAMUSCULAR at 19:09

## 2025-07-14 RX ADMIN — OLANZAPINE 5 MG: 5 TABLET, FILM COATED ORAL at 21:29

## 2025-07-14 RX ADMIN — PANTOPRAZOLE SODIUM 40 MG: 40 TABLET, DELAYED RELEASE ORAL at 06:18

## 2025-07-14 RX ADMIN — HALOPERIDOL LACTATE 2.5 MG: 5 INJECTION, SOLUTION INTRAMUSCULAR at 13:04

## 2025-07-14 ASSESSMENT — PAIN SCALES - GENERAL: PAINLEVEL_OUTOF10: 0

## 2025-07-14 NOTE — CARE COORDINATION
Transition of Care: TBD; per attending the recs are for SNF/LTC memory care bed; PENDING referrals (for memory care bed) sent to [place], [place], [place], [facility], [facility], [place], [place], [place];    NOTE: patient normally lives at home with his spouse (who is elderly and blind) and his stepdaughter; pt needs help with all adls; pt has  dementia    NOTE: ED CM opened up a new APS caseCM placed call to [phone] to make an additional report on 7/11/25  Report number is #[id number]. Case will be transferred to [facility] [phone] for continued follow-up.    NOTE: patients PCP (Gil Ortega is requesting SNF placement on 7/14 - pt has  [employer] medicare- auth needed)     Transport Plan: in [facility] (not set up yet)    RUR: 12%    Main contacts are pts spouse- [facility]- [phone] and step daughter- Barbi (lives in the same home)       [id number]: this CM called pts spouse and stepdaughter at home to discuss recs from attending for SNF/TC; pt has [employer] medicare; likely not to get authorization for skilled need at \[facility]\"" time; pt needs a LTC bed with a memory care bed; pts wife stated that she thinks they have the funds for private pay (this CM explained to her that [employer] medicare will not authorize for him to go to a SNF for any skilled needs- pt is back at his baseline at this time)  they are both in agreement and have no prefence for SNF choice; referrals sent via careport to SNFs with known locked memory care units    Prior Level of Functioning:  has dementia, lives with spouse and stepdaughter; needs help with all adls  Disposition: TBD- needs LTC memory care private pay bed at SNF  CARLITOS: 24-48 hours  If SNF or IPR: Date FOC offered: 7/14  Date FOC received: 7/14   Accepting facility: not yet  Date authorization started with reference number: not needed  Date authorization received and expires: n/a  Follow up appointments: specialists/pcp  DME needed: none

## 2025-07-14 NOTE — PROGRESS NOTES
[facility]                                                                                          Hospitalist Progress Note  Bobby Huggins MD  Office Phone: [phone]        Date of Service:  2025  NAME:  Oleg Ren Jr  :  1945  MRN:  [id number]       Admission Summary:   Oleg Ren Jr is a 79 y.o. male with past medical history of cancer of ascending colon, laparoscopic partial colectomy with resection of terminal ileum 2010, laparoscopic cholecystectomy, hypertension, hyperlipidemia, anemia, tinnitus presented to the ED via EMS from home with chief complaint of nausea, vomiting, hypertension.    Exact onset of symptoms is not known.  Patient report is a limited historian.  Majority of history is obtained per review of ED and electronic medical records.  Per ER reports, patient's family had noted that patient was refusing to take medications and refusing to eat food.  EMS was called to the scene and transported patient to the ED.  On arrival in the ED, initial recorded vital signs were temperature 97.9 °F, /89, heart rate 86, respiratory rate 18, O2 saturation 99% on room air.  12-lead EKG showed sinus rhythm, premature supraventricular complexes, frequent PVCs, incomplete RBBB, ST/T wave changes in the septal leads 86 bpm.  Abnormal labs included WBC 15.8, neutrophils 90.2%, BUN 24, GFR 56, , calcium 10.5.  UA showed 15 ketones, negative blood, negative glucose, 300 protein, negative nitrite, negative leukocyte esterase, 0-4 WBC, 0-5 RBC, 3+ bacteria.  CT head without IV contrast showed cerebral atrophy, white matter changes most compatible chronic small vessel ischemic and/or senescent change, intracranial atherosclerosis but no acute intracranial hemorrhage, mass, infarct.  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

## 2025-07-14 NOTE — PROGRESS NOTES
Surgery Progress Note    7/14/2025    Admit Date: 7/11/2025  3:23 PM    CC: Worried about his mother    Subjective:     Patient without abdominal pain.  Having bowel function.  Delirious this morning    Constitutional: No fever or chills  Neurologic: No headache  Eyes: No scleral icterus or irritated eyes  Nose: No nasal pain or drainage  Mouth: No oral lesions or sore throat  Cardiac: No palpations or chest pain  Pulmonary: No cough or shortness of breath  Gastrointestinal: No nausea, emesis, diarrhea, or constipation  Genitourinary: No dysuria  Musculoskeletal: No muscle or joint tenderness  Skin: No rashes or lesions  Psychiatric: Delirious    Objective:     Vitals:    07/14/25 0726   BP: (!) 172/74   Pulse: 69   Resp: 16   Temp: 98.6 °F (37 °C)   SpO2: 96%       General: No acute distress, conversant  Eyes: PERRLA, no scleral icterus  HENT: Normocephalic without oral lesions  Neck: Trachea midline without LAD  Cardiac: Normal pulse rate and rhythm  Pulmonary: Symmetric chest rise with normal effort  GI: Soft, NT, ND, no hernia, no splenomegaly  Skin: Warm without rash  Extremities: No edema or joint stiffness  Psych: GCS 14    Labs, vital signs, and I/O reviewed.    Assessment:     79-year-old male with resolving SBO    Plan:     Delirious this morning  Okay for regular diet.  No abdominal pain.  Can discharge per primary team    Lino Weeks MD, [facility], [facility]  Bariatric and General Surgeon  [facility]

## 2025-07-14 NOTE — PLAN OF CARE
Problem: Pain  Goal: Verbalizes/displays adequate comfort level or baseline comfort level  Outcome: Progressing     Problem: Safety - Medical Restraint  Goal: Remains free of injury from restraints (Restraint for Interference with Medical Device)  Description: INTERVENTIONS:  1. Determine that other, less restrictive measures have been tried or would not be effective before applying the restraint  2. Evaluate the patient's condition at the time of restraint application  3. Inform patient/family regarding the reason for restraint  4. Q2H: Monitor safety, psychosocial status, comfort, nutrition and hydration  Outcome: Progressing     Problem: Safety - Adult  Goal: Free from fall injury  7/14/2025 1123 by Geraldine Ridley, RN  Outcome: Progressing  7/13/2025 2226 by Susannah Joya, RN  Outcome: Progressing

## 2025-07-14 NOTE — PROGRESS NOTES
Spiritual Health History and Assessment/Progress Note  [facility]    Attempted Encounter,  ,  ,      Name: Oleg Ren Jr MRN: [id number]    Age: 79 y.o.     Sex: male   Language: English   Catholic: Other   Bowel obstruction (HCC)     Date: 7/14/2025            Total Time Calculated: 15 min              Spiritual Assessment began in [facility] 5E1 SURGICAL UNIT        Referral/Consult From: Nurse   Encounter Overview/Reason: Attempted Encounter  Service Provided For: Patient    Kellie, Belief, Meaning:   Patient unable to assess at this time  Family/Friends No family/friends present      Importance and Influence:  Patient unable to assess at this time  Family/Friends No family/friends present    Community:  Patient Other:  visit rejected  Family/Friends No family/friends present    Assessment and Plan of Care:     Patient Interventions include: Other: Declined to talk  Family/Friends Interventions include: No family/friends present    Patient Plan of Care: No spiritual needs identified for follow-up  Family/Friends Plan of Care: No family/friends present    Electronically signed by Chaplain Yenni Intern on 7/14/2025 at 11:06 AM

## 2025-07-14 NOTE — PLAN OF CARE
Physical Therapy - defer  Order received, chart reviewed in prep for PT evaluation, OT provided report.  Per OT pt is at baseline, no acute OT needs.  This PT observed pt walking laps in the [employer] w/ supv of nsg student w/ slow steady gait, trent post op shoes which appear too small (OT reporting RN already aware and addressing), no DME, no LOB.  Noted pt is open to HHPT/ OT.  Recommend resumption of services.  Based on OT report and observation of pt's mobility, acute PT is not indicated at this time.  Will defer formal assessment and sign off.  Please re-consult if needs arise.  Thank you.

## 2025-07-14 NOTE — PROGRESS NOTES
OCCUPATIONAL THERAPY EVALUATION/DISCHARGE  Patient: Oleg Ren Jr (79 y.o. male)  Date: 7/14/2025  Primary Diagnosis: Delirium [R41.0]  Bowel obstruction (HCC) [K56.609]  Failure to thrive in adult [R62.7]  Abdominal pain, unspecified abdominal location [R10.9]  Leukocytosis, unspecified type [D72.829]  Intestinal obstruction, unspecified cause, unspecified whether partial or complete (HCC) [K56.609]  Nausea and vomiting, unspecified vomiting type [R11.2]         Precautions:                    ASSESSMENT :  Based on the objective data below, the patient presents at his baseline, requiring SPV for functional mobility and SPV-Min A for BADLs, infer increased A for IADLs s/p admission for SBO, non-operative management required. Patient received ambulating with staff, able to don his own post-op shoes with SPV, no LOB with reaching for items on the floor, declined full AROM and seated assessment. He is confused though pleasant with activity as long as ambulation continued. Based on chart review, he is at his functional baseline with hx of dementia, would benefit from resumption of HHOT services however anticipate patient and his wife would be best suited for transition to [facility], discussed with CM. No further acute OT needs, will sign off.     Functional Outcome Measure:  The patient scored 18/24 on the AM-PAC outcome measure which is indicative of higher odds for rehab/therapy needs at d/c however appears to be at his functional baseline.      Further skilled acute occupational therapy is not indicated at this time.     PLAN :  Recommend with staff: up with SPV, ADLs in bathroom, ambulation with SPV    Recommendation for discharge: (in order for the patient to meet his/her long term goals):   Intermittent occupational therapy up to 2-3x/week in previous living setting    Other factors to consider for discharge: poor safety awareness and impaired cognition    IF patient discharges home will need the following DME:  cognitive, or psychosocial skills that result in activity limitations and/or participation restrictions LOW Complexity: No comorbidities that affect functional and  no verbal  or physical assist needed to complete eval tasks   Based on the above components, the patient evaluation is determined to be of the following complexity level: Low

## 2025-07-15 PROCEDURE — 1200000000 HC SEMI PRIVATE

## 2025-07-15 PROCEDURE — 6370000000 HC RX 637 (ALT 250 FOR IP): Performed by: STUDENT IN AN ORGANIZED HEALTH CARE EDUCATION/TRAINING PROGRAM

## 2025-07-15 PROCEDURE — 99232 SBSQ HOSP IP/OBS MODERATE 35: CPT | Performed by: SURGERY

## 2025-07-15 PROCEDURE — 2500000003 HC RX 250 WO HCPCS: Performed by: FAMILY MEDICINE

## 2025-07-15 RX ADMIN — OLANZAPINE 5 MG: 5 TABLET, FILM COATED ORAL at 23:39

## 2025-07-15 RX ADMIN — SODIUM CHLORIDE, PRESERVATIVE FREE 10 ML: 5 INJECTION INTRAVENOUS at 23:26

## 2025-07-15 RX ADMIN — PANTOPRAZOLE SODIUM 40 MG: 40 TABLET, DELAYED RELEASE ORAL at 06:32

## 2025-07-15 NOTE — PROGRESS NOTES
Surgery Progress Note    7/15/2025    Admit Date: 7/11/2025  3:23 PM    CC: Restless    Subjective:     Patient restless yesterday.  Hard to keep in one place.  Sitter at bedside.  Ate some.    Constitutional: No fever or chills  Neurologic: No headache  Eyes: No scleral icterus or irritated eyes  Nose: No nasal pain or drainage  Mouth: No oral lesions or sore throat  Cardiac: No palpations or chest pain  Pulmonary: No cough or shortness of breath  Gastrointestinal: No nausea, emesis, diarrhea, or constipation  Genitourinary: No dysuria  Musculoskeletal: No muscle or joint tenderness  Skin: No rashes or lesions  Psychiatric: Delirious    Objective:     Vitals:    07/15/25 0726   BP: (!) 178/67   Pulse: 56   Resp: 14   Temp: 98.2 °F (36.8 °C)   SpO2: 100%       General: No acute distress, conversant  Eyes: PERRLA, no scleral icterus  HENT: Normocephalic without oral lesions  Neck: Trachea midline without LAD  Cardiac: Normal pulse rate and rhythm  Pulmonary: Symmetric chest rise with normal effort  GI: Soft, NT, ND, no hernia, no splenomegaly  Skin: Warm without rash  Extremities: No edema or joint stiffness  Psych: GCS 14    Labs, vital signs, and I/O reviewed.    Assessment:     79-year-old male with resolved SBO    Plan:     Persistent delirium.  Sitter at bedside  Continue regular diet  Can discharge per primary team  Surgery to sign off    Lino Weeks MD, FACS, [facility]  Bariatric and General Surgeon  [facility]

## 2025-07-15 NOTE — PROGRESS NOTES
[facility]                                                                                          Hospitalist Progress Note  Bobby Huggins MD  Office Phone: [phone]        Date of Service:  7/15/2025  NAME:  Oleg Ren Jr  :  1945  MRN:  [id number]       Admission Summary:   Oleg Ren Jr is a 79 y.o. male with past medical history of cancer of ascending colon, laparoscopic partial colectomy with resection of terminal ileum 2010, laparoscopic cholecystectomy, hypertension, hyperlipidemia, anemia, tinnitus presented to the ED via EMS from home with chief complaint of nausea, vomiting, hypertension.    Exact onset of symptoms is not known.  Patient report is a limited historian.  Majority of history is obtained per review of ED and electronic medical records.  Per ER reports, patient's family had noted that patient was refusing to take medications and refusing to eat food.  EMS was called to the scene and transported patient to the ED.  On arrival in the ED, initial recorded vital signs were temperature 97.9 °F, /89, heart rate 86, respiratory rate 18, O2 saturation 99% on room air.  12-lead EKG showed sinus rhythm, premature supraventricular complexes, frequent PVCs, incomplete RBBB, ST/T wave changes in the septal leads 86 bpm.  Abnormal labs included WBC 15.8, neutrophils 90.2%, BUN 24, GFR 56, , calcium 10.5.  UA showed 15 ketones, negative blood, negative glucose, 300 protein, negative nitrite, negative leukocyte esterase, 0-4 WBC, 0-5 RBC, 3+ bacteria.  CT head without IV contrast showed cerebral atrophy, white matter changes most compatible chronic small vessel ischemic and/or senescent change, intracranial atherosclerosis but no acute intracranial hemorrhage, mass, infarct.  CT abdomen pelvis with IV contrast showed probable postsurgical adhesive small bowel and closed-loop colon obstruction with small ascites; other  input(s): \"INR\", \"APTT\" in the last 72 hours.    Invalid input(s): \"PTP\"   No results for input(s): \"TIBC\" in the last 72 hours.    Invalid input(s): \"FE\", \"PSAT\", \"FERR\"   No results found for: \"RBCF\"   No results for input(s): \"PH\", \"PCO2\", \"PO2\" in the last 72 hours.  No results for input(s): \"CPK\" in the last 72 hours.    Invalid input(s): \"CPKMB\", \"CKNDX\", \"TROIQ\"  No results found for: \"CHOL\", \"CHLST\", \"CHOLV\", \"HDL\", \"HDLC\", \"LDL\", \"LDLC\"  No results found for: \"GLUCPOC\"  [unfilled]    Notes reviewed from all clinical/nonclinical/nursing services involved in patient's clinical care. Care coordination discussions were held with appropriate clinical/nonclinical/ nursing providers based on care coordination needs.         Patients current active Medications were reviewed, considered, added and adjusted based on the clinical condition today.      Home Medications were reconciled to the best of my ability given all available resources at the time of admission. Route is PO if not otherwise noted.      Admission Status:[id number]:::1}      Medications Reviewed:     Current Facility-Administered Medications   Medication Dose Route Frequency    OLANZapine (ZYPREXA) tablet 5 mg  5 mg Oral Nightly    sodium chloride flush 0.9 % injection 5-40 mL  5-40 mL IntraVENous 2 times per day    sodium chloride flush 0.9 % injection 5-40 mL  5-40 mL IntraVENous PRN    0.9 % sodium chloride infusion   IntraVENous PRN    ondansetron (ZOFRAN-ODT) disintegrating tablet 4 mg  4 mg Oral Q8H PRN    Or    ondansetron (ZOFRAN) injection 4 mg  4 mg IntraVENous Q6H PRN    acetaminophen (TYLENOL) tablet 650 mg  650 mg Oral Q6H PRN    Or    acetaminophen (TYLENOL) suppository 650 mg  650 mg Rectal Q6H PRN    haloperidol lactate (HALDOL) injection 2.5 mg  2.5 mg IntraVENous Q6H PRN    pantoprazole (PROTONIX) tablet 40 mg  40 mg Oral QAM AC     ______________________________________________________________________  EXPECTED LENGTH OF STAY: 5  ACTUAL

## 2025-07-15 NOTE — CARE COORDINATION
Transition of Care: per attending the recs are for SNF/LTC memory care bed;ACCEPTED: [facility]- pts family 1st choice)  (for memory care bed) : PENDING bed ready at [facility] (wont be ready until Thursday 7/17- [facility] is meeting with the pts wife on Thursday 7/17 to do paperwork and financials )     Contact at [facility] is Olive- [phone]    ACCEPTED:   [facility]    DENIED:   [place]- no locked unit  [place],   [facility],   [facility],   [place],   [facility],   [facility]         NOTE: patient normally lives at home with his spouse (who is elderly and blind) and his stepdaughter; pt needs help with all adls; pt has  dementia     NOTE: ED CM opened up a new APS caseCM placed call to [phone] to make an additional report on 7/11/25  Report number is #[id number]. Case will be transferred to [facility] [phone] for continued follow-up.     NOTE: patients PCP ([facility] is requesting SNF placement on 7/14 - pt has  [employer] medicare- auth needed)      Transport Plan: in [facility] (not set up yet)     RUR: 9%     Main contacts are pts spouse- Bere Mikal- [phone] and step daughter- Barbi (lives in the same home)      1430: this Cm called pts spouse to followup on confirmation of her meeting with Damaris tomorrow; she confirmed a 1pm meeting at her house; this CM also called Olive at [facility] to confirm meeting tomorrow- she verbalized understanding      Prior Level of Functioning:  has dementia, lives with spouse and stepdaughter; needs help with all adls  Disposition:needs LTC memory care private pay bed at [facility]- [facility] accepted and [place] accepted  CARLITOS: 48 hours  If SNF or IPR: Date FOC offered: 7/14  Date FOC received: 7/14   Accepting facility:  [facility] (pts family 1st choice) and [place]  Date authorization started with reference number: not needed  Date authorization received and expires: n/a  Follow up appointments: specialists/pcp  DME needed:

## 2025-07-16 PROCEDURE — 1200000000 HC SEMI PRIVATE

## 2025-07-16 PROCEDURE — 6370000000 HC RX 637 (ALT 250 FOR IP): Performed by: STUDENT IN AN ORGANIZED HEALTH CARE EDUCATION/TRAINING PROGRAM

## 2025-07-16 PROCEDURE — 2500000003 HC RX 250 WO HCPCS: Performed by: FAMILY MEDICINE

## 2025-07-16 RX ADMIN — SODIUM CHLORIDE, PRESERVATIVE FREE 10 ML: 5 INJECTION INTRAVENOUS at 21:44

## 2025-07-16 RX ADMIN — SODIUM CHLORIDE, PRESERVATIVE FREE 10 ML: 5 INJECTION INTRAVENOUS at 15:15

## 2025-07-16 RX ADMIN — OLANZAPINE 5 MG: 5 TABLET, FILM COATED ORAL at 21:44

## 2025-07-16 NOTE — PROGRESS NOTES
[facility]                                                                                          Hospitalist Progress Note  Bobby Huggins MD  Office Phone: [phone]        Date of Service:  2025  NAME:  Oleg Ren Jr  :  1945  MRN:  [id number]       Admission Summary:   Oleg Ren Jr is a 79 y.o. male with past medical history of cancer of ascending colon, laparoscopic partial colectomy with resection of terminal ileum 2010, laparoscopic cholecystectomy, hypertension, hyperlipidemia, anemia, tinnitus presented to the ED via EMS from home with chief complaint of nausea, vomiting, hypertension.    Exact onset of symptoms is not known.  Patient report is a limited historian.  Majority of history is obtained per review of ED and electronic medical records.  Per ER reports, patient's family had noted that patient was refusing to take medications and refusing to eat food.  EMS was called to the scene and transported patient to the ED.  On arrival in the ED, initial recorded vital signs were temperature 97.9 °F, /89, heart rate 86, respiratory rate 18, O2 saturation 99% on room air.  12-lead EKG showed sinus rhythm, premature supraventricular complexes, frequent PVCs, incomplete RBBB, ST/T wave changes in the septal leads 86 bpm.  Abnormal labs included WBC 15.8, neutrophils 90.2%, BUN 24, GFR 56, , calcium 10.5.  UA showed 15 ketones, negative blood, negative glucose, 300 protein, negative nitrite, negative leukocyte esterase, 0-4 WBC, 0-5 RBC, 3+ bacteria.  CT head without IV contrast showed cerebral atrophy, white matter changes most compatible chronic small vessel ischemic and/or senescent change, intracranial atherosclerosis but no acute intracranial hemorrhage, mass, infarct.  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

## 2025-07-16 NOTE — PLAN OF CARE
Problem: Discharge Planning  Goal: Discharge to home or other facility with appropriate resources  Outcome: Progressing  Flowsheets (Taken 7/16/2025 1704)  Discharge to home or other facility with appropriate resources: Arrange for needed discharge resources and transportation as appropriate  Note: Waiting for the bed     Problem: Pain  Goal: Verbalizes/displays adequate comfort level or baseline comfort level  Outcome: Progressing  Flowsheets (Taken 7/16/2025 1704)  Verbalizes/displays adequate comfort level or baseline comfort level: Assess pain using appropriate pain scale     Problem: Safety - Adult  Goal: Free from fall injury  Outcome: Progressing

## 2025-07-17 PROBLEM — K56.609 BOWEL OBSTRUCTION (HCC): Status: RESOLVED | Noted: 2025-07-12 | Resolved: 2025-07-17

## 2025-07-17 PROBLEM — R11.2 NAUSEA AND VOMITING: Status: RESOLVED | Noted: 2025-07-12 | Resolved: 2025-07-17

## 2025-07-17 LAB
BACTERIA SPEC CULT: NORMAL
BACTERIA SPEC CULT: NORMAL
SERVICE CMNT-IMP: NORMAL
SERVICE CMNT-IMP: NORMAL

## 2025-07-17 PROCEDURE — 2500000003 HC RX 250 WO HCPCS: Performed by: FAMILY MEDICINE

## 2025-07-17 PROCEDURE — 6370000000 HC RX 637 (ALT 250 FOR IP): Performed by: STUDENT IN AN ORGANIZED HEALTH CARE EDUCATION/TRAINING PROGRAM

## 2025-07-17 PROCEDURE — 6370000000 HC RX 637 (ALT 250 FOR IP): Performed by: FAMILY MEDICINE

## 2025-07-17 PROCEDURE — 1200000000 HC SEMI PRIVATE

## 2025-07-17 PROCEDURE — 6370000000 HC RX 637 (ALT 250 FOR IP): Performed by: NURSE PRACTITIONER

## 2025-07-17 PROCEDURE — 6360000002 HC RX W HCPCS: Performed by: STUDENT IN AN ORGANIZED HEALTH CARE EDUCATION/TRAINING PROGRAM

## 2025-07-17 RX ORDER — LOSARTAN POTASSIUM 25 MG/1
25 TABLET ORAL DAILY
Status: DISCONTINUED | OUTPATIENT
Start: 2025-07-17 | End: 2025-07-18 | Stop reason: HOSPADM

## 2025-07-17 RX ADMIN — SODIUM CHLORIDE, PRESERVATIVE FREE 10 ML: 5 INJECTION INTRAVENOUS at 08:20

## 2025-07-17 RX ADMIN — SODIUM CHLORIDE, PRESERVATIVE FREE 10 ML: 5 INJECTION INTRAVENOUS at 21:08

## 2025-07-17 RX ADMIN — ACETAMINOPHEN 650 MG: 325 TABLET ORAL at 13:52

## 2025-07-17 RX ADMIN — LOSARTAN POTASSIUM 25 MG: 25 TABLET, FILM COATED ORAL at 16:04

## 2025-07-17 RX ADMIN — OLANZAPINE 5 MG: 5 TABLET, FILM COATED ORAL at 21:06

## 2025-07-17 RX ADMIN — HALOPERIDOL LACTATE 2.5 MG: 5 INJECTION, SOLUTION INTRAMUSCULAR at 17:08

## 2025-07-17 RX ADMIN — PANTOPRAZOLE SODIUM 40 MG: 40 TABLET, DELAYED RELEASE ORAL at 06:45

## 2025-07-17 ASSESSMENT — PAIN SCALES - GENERAL: PAINLEVEL_OUTOF10: 3

## 2025-07-17 ASSESSMENT — PAIN DESCRIPTION - LOCATION: LOCATION: FOOT

## 2025-07-17 NOTE — PLAN OF CARE
Problem: Discharge Planning  Goal: Discharge to home or other facility with appropriate resources  Outcome: Progressing  Flowsheets (Taken 7/17/2025 1636)  Discharge to home or other facility with appropriate resources: Arrange for needed discharge resources and transportation as appropriate  Note: Will be discharge tomorrow morning via stretcher to [place]     Problem: Pain  Goal: Verbalizes/displays adequate comfort level or baseline comfort level  Outcome: Progressing  Flowsheets (Taken 7/17/2025 1636)  Verbalizes/displays adequate comfort level or baseline comfort level: Assess pain using appropriate pain scale     Problem: Safety - Adult  Goal: Free from fall injury  Outcome: Progressing  Flowsheets (Taken 7/17/2025 1636)  Free From Fall Injury: Instruct family/caregiver on patient safety  Note: 1:1 observer

## 2025-07-17 NOTE — PROGRESS NOTES
has eaten well.     Assessment & Plan:     Small bowel obstruction resolved  Nausea vomiting resolved   Hx of partial colectomy w/ resection of terminal ileum 2010  Hx of laparoscopic cholecystectomy  - CT abdomen pelvis 7/12/2025 probable postsurgical ileus of small bowel and closed loop colon obstruction with small ascites.  Lactic acid 1.0.  Surgical team on board appreciate help.  CT abdomen pelvis 7/12/2025 obstructive pattern of bowel is partially improved.  KUB 7/13/2025 improved appearance of obstruction with internal hernia  - Regular diet and eating well  - General Surgery signed off   - PPI,  Zofran as needed    Advance Alzheimer disease with behavioral issues  - Haldol as needed  - Zyprexa nightly  - Supportive care    Hx of hypertension  Hx of hyperlipidemia  - BP running high, home medications reviewed  - avoiding BB due to already low HR  - unsure what meds he has been taking - med rec not complete and he is a poor historian.   - will start losartan      Code status: Full  Prophylaxis: SCD  Central Line:   none  Care Plan discussed with: patient,   Anticipated Disposition: [facility] in am    Needs Toenails cut - needs Podiatry consult       Social Drivers of Health     Tobacco Use: High Risk (7/12/2025)    Patient History     Smoking Tobacco Use: Every Day     Smokeless Tobacco Use: Never     Passive Exposure: Not on file   Alcohol Use: Not on file   Financial Resource Strain: Not on file   Food Insecurity: Not on file   Transportation Needs: Not on file   Physical Activity: Not on file   Stress: Not on file   Social Connections: Not on file   Intimate Partner Violence: Not on file   Depression: Not on file   Housing Stability: Not on file   Interpersonal Safety: Not on file   Utilities: Not on file     Review of Systems:     As mentioned above in the interval history    Vital Signs:    Last 24hrs VS reviewed since prior progress note. Most recent are:  Vitals:    07/17/25 0009   BP:  (!) 158/58   Pulse: (!) 47   Resp:    Temp: 97.7 °F (36.5 °C)   SpO2: 97%       Intake/Output Summary (Last 24 hours) at 7/17/2025 0714  Last data filed at 7/16/2025 0751  Gross per 24 hour   Intake 120 ml   Output --   Net 120 ml      Physical Examination:     I had a face to face encounter with this patient and independently examined them on 7/17/2025 as outlined below:        General : Elderly, unkept male lying in bed in no acute distress, cooperative and interactive with assessment  HEENT: PEERL, EOMI, moist mucus membranes  Neck: trachea midline  Chest: Clear to auscultation bilaterally, sats 97% RA   CVS: RRR no murmur, HR 62  Abd: soft/ non tender, non distended, BS active  Ext: PATEL   Neuro/Psych: Confused, oriented to self and wife only. Sensory grossly within normal limit, Strength 5/5 in all extremities.   Skin: warm. Very long toenails.      Data Review:   Review and/or order of clinical lab test  Review and/or order of tests in the medicine section of [facility]    I have personally and independently reviewed all pertinent labs, diagnostic studies, imaging, and have provided independent interpretation of the same.     Labs:     No results for input(s): \"WBC\", \"HGB\", \"HCT\", \"PLT\" in the last 72 hours.    No results for input(s): \"NA\", \"K\", \"CL\", \"CO2\", \"BUN\", \"GLU\", \"MG\", \"PHOS\" in the last 72 hours.    Invalid input(s): \"CREA\", \"CA\", \"URICA\"    No results for input(s): \"ALT\", \"TP\", \"GLOB\", \"GGT\" in the last 72 hours.    Invalid input(s): \"SGOT\", \"GPT\", \"AP\", \"TBIL\", \"TBILI\", \"ALB\", \"AML\", \"AMYP\", \"LPSE\", \"HLPSE\"    No results for input(s): \"INR\", \"APTT\" in the last 72 hours.    Invalid input(s): \"PTP\"   No results for input(s): \"TIBC\" in the last 72 hours.    Invalid input(s): \"FE\", \"PSAT\", \"FERR\"   No results found for: \"RBCF\"   No results for input(s): \"PH\", \"PCO2\", \"PO2\" in the last 72 hours.  No results for input(s): \"CPK\" in the last 72 hours.    Invalid input(s): \"CPKMB\", \"CKNDX\", \"TROIQ\"  No results

## 2025-07-17 NOTE — CARE COORDINATION
Transition of Care: per attending the recs are for SNF/LTC memory care bed;ACCEPTED: [facility]- pts family 1st choice)  (for memory care bed) : PENDING bed ready at [facility] (wont be ready until Thursday 7/17- [facility] is meeting with the pts wife on Thursday 7/17 to do paperwork and financials ); bed at [facility] will be ready on Friday 7/18      Contact at [facility] is Olive- [phone]       NOTE: patient normally lives at home with his spouse (who is elderly and blind) and his stepdaughter; pt needs help with all adls; pt has  dementia     NOTE: ED CM opened up a new APS caseCM placed call to [phone] to make an additional report on 7/11/25  Report number is #[id number]. Case will be transferred to [facility] [phone] for continued follow-up.     NOTE: patients PCP ([facility] is requesting SNF placement on 7/14 - pt has  [employer] medicare- auth needed)      Transport Plan: in [facility]- scheduled for Friday 7/18/25 at 10am     RUR: 9%     Main contacts are pts spouse- Bere Mikal- [phone] and step daughter- Barbi (lives in the same home)      1400: this CM received call from Olive at [facility]- she has met with pts spouse and stepdaughter today and is finalizing the pts transfer to [facility]; pt s bed will be ready on Friday 7/18; this CM updated attending ; she verbalized understanding      1620: this CM called pts spouse (and stepdaughter) to followup on her meeting with [place] today and to update that discharge will happen on Friday 7/17; she verbalized understanding    Prior Level of Functioning:  has dementia, lives with spouse and stepdaughter; needs help with all adls  Disposition:needs LTC memory care private pay bed at [facility]- [facility] accepted and [place] accepted  CARLITOS: today  If SNF or IPR: Date FOC offered: 7/14  Date FOC received: 7/14   Accepting facility:  [facility] (pts family 1st choice) and [facility]  Date authorization started with reference number:

## 2025-07-17 NOTE — PLAN OF CARE
Problem: Safety - Adult  Goal: Free from fall injury  7/16/2025 2228 by Susannah Joya, RN  Outcome: Progressing  7/16/2025 1704 by Enoch Andrew, RN  Outcome: Progressing

## 2025-07-18 VITALS
RESPIRATION RATE: 20 BRPM | OXYGEN SATURATION: 94 % | WEIGHT: 135.5 LBS | HEIGHT: 64 IN | DIASTOLIC BLOOD PRESSURE: 85 MMHG | TEMPERATURE: 97.9 F | BODY MASS INDEX: 23.13 KG/M2 | HEART RATE: 67 BPM | SYSTOLIC BLOOD PRESSURE: 146 MMHG

## 2025-07-18 LAB
ANION GAP SERPL CALC-SCNC: 8 MMOL/L (ref 2–12)
BUN SERPL-MCNC: 19 MG/DL (ref 6–20)
BUN/CREAT SERPL: 17 (ref 12–20)
CALCIUM SERPL-MCNC: 8.8 MG/DL (ref 8.5–10.1)
CHLORIDE SERPL-SCNC: 108 MMOL/L (ref 97–108)
CO2 SERPL-SCNC: 24 MMOL/L (ref 21–32)
CREAT SERPL-MCNC: 1.13 MG/DL (ref 0.7–1.3)
GLUCOSE SERPL-MCNC: 96 MG/DL (ref 65–100)
POTASSIUM SERPL-SCNC: 3.9 MMOL/L (ref 3.5–5.1)
SODIUM SERPL-SCNC: 140 MMOL/L (ref 136–145)

## 2025-07-18 PROCEDURE — 6360000002 HC RX W HCPCS: Performed by: STUDENT IN AN ORGANIZED HEALTH CARE EDUCATION/TRAINING PROGRAM

## 2025-07-18 PROCEDURE — 80048 BASIC METABOLIC PNL TOTAL CA: CPT

## 2025-07-18 PROCEDURE — 6370000000 HC RX 637 (ALT 250 FOR IP): Performed by: STUDENT IN AN ORGANIZED HEALTH CARE EDUCATION/TRAINING PROGRAM

## 2025-07-18 PROCEDURE — 6370000000 HC RX 637 (ALT 250 FOR IP): Performed by: NURSE PRACTITIONER

## 2025-07-18 RX ORDER — PANTOPRAZOLE SODIUM 40 MG/1
40 TABLET, DELAYED RELEASE ORAL
Qty: 30 TABLET | Refills: 3 | Status: SHIPPED | OUTPATIENT
Start: 2025-07-19

## 2025-07-18 RX ORDER — LOSARTAN POTASSIUM 25 MG/1
25 TABLET ORAL DAILY
Qty: 30 TABLET | Refills: 3 | Status: SHIPPED | OUTPATIENT
Start: 2025-07-18

## 2025-07-18 RX ORDER — OLANZAPINE 5 MG/1
5 TABLET, FILM COATED ORAL NIGHTLY
Qty: 30 TABLET | Refills: 3 | Status: SHIPPED | OUTPATIENT
Start: 2025-07-18

## 2025-07-18 RX ADMIN — LOSARTAN POTASSIUM 25 MG: 25 TABLET, FILM COATED ORAL at 09:47

## 2025-07-18 RX ADMIN — PANTOPRAZOLE SODIUM 40 MG: 40 TABLET, DELAYED RELEASE ORAL at 07:15

## 2025-07-18 RX ADMIN — HALOPERIDOL LACTATE 2.5 MG: 5 INJECTION, SOLUTION INTRAMUSCULAR at 00:14

## 2025-07-18 NOTE — DISCHARGE SUMMARY
Discharge Summary       PATIENT ID: Oleg Ren Jr  MRN: [id number]   YOB: 1945    DATE OF ADMISSION: 7/11/2025  3:23 PM    DATE OF DISCHARGE: 07/18/25     PRIMARY CARE PROVIDER: Alex Russell MD     ATTENDING PHYSICIAN: Dr. Lon Funes  DISCHARGING PROVIDER: ALIZA Hauser CNP    To contact this individual call [phone] and ask the  to page.  If unavailable ask to be transferred the Adult Hospitalist Department.    CONSULTATIONS: IP CONSULT TO CASE MANAGEMENT  IP CONSULT TO GENERAL SURGERY  IP CONSULT TO SPIRITUAL SERVICES    PROCEDURES/SURGERIES: * No surgery found *     ADMITTING DIAGNOSES & HOSPITAL COURSE:     Mr. Ren is a 79-year-old male with a past medical history of cancer of ascending colon, laparoscopic partial colectomy with resection of the terminal ileum on 9/2/2010, laparoscopic cholecystectomy, hypertension, hyperlipidemia, anemia and tinnitus who presented to the ED via EMS from home with a chief complaint of nausea, vomiting and hypertension.  The exact onset of his symptoms was not known, he is a limited historian.  Per ED report, patient's family had noted that he was refusing to take medications and eat food.  EMS was called to the scene and transported patient to the ED.  Abnormal labs included a WBC of 15.8 CT of the head without IV contrast showed cerebral atrophy, white matter changes most compatible with chronic small vessel ischemic and/or senescent change, intracranial arthrosclerosis but no acute intracranial hemorrhage, mass, infarct.  CT of the abdomen pelvis with IV contrast showed probable postsurgical adhesive small bowel and closed-loop colon obstruction with small ascites.  Other incidental findings including dependent atelectasis, calcific granulomata and a surgically absent gallbladder.  Patient was placed on antibiotics and given Haldol and Geodon in the ED.  General surgery was consulted.  Hospitalist was also

## 2025-07-18 NOTE — DISCHARGE INSTRUCTIONS
Discharge Instructions       PATIENT ID: Oleg Ren Jr  MRN: [id number]   YOB: 1945    DATE OF ADMISSION: 7/11/2025   DATE OF DISCHARGE: 7/18/2025    PRIMARY CARE PROVIDER: Alex Russell     ATTENDING PHYSICIAN: Lon Funes MD   DISCHARGING PROVIDER: ALIZA Hauser CNP    To contact this individual call [phone] and ask the  to page.   If unavailable ask to be transferred the Adult Hospitalist Department.    DISCHARGE DIAGNOSES small bowel obstructin    CONSULTATIONS: [unfilled]    PROCEDURES/SURGERIES: * No surgery found *    PENDING TEST RESULTS:   At the time of discharge the following test results are still pending: none    FOLLOW UP APPOINTMENTS:   @[facility]     ADDITIONAL CARE RECOMMENDATIONS: none    DIET: low residue, no raw fruit, no raw vegetables  Oral Nutritional Supplements: Ensure High Proonce a day    ACTIVITY: activity as tolerated    WOUND CARE: none    EQUIPMENT needed: none      DISCHARGE MEDICATIONS:   See Medication Reconciliation Form    It is important that you take the medication exactly as they are prescribed.   Keep your medication in the bottles provided by the pharmacist and keep a list of the medication names, dosages, and times to be taken in your wallet.   Do not take other medications without consulting your doctor.       NOTIFY YOUR PHYSICIAN FOR ANY OF THE FOLLOWING:   Fever over 101 degrees for 24 hours.   Chest pain, shortness of breath, fever, chills, nausea, vomiting, diarrhea, change in mentation, falling, weakness, bleeding. Severe pain or pain not relieved by medications.  Or, any other signs or symptoms that you may have questions about.      DISPOSITION:    Home With:   OT  PT  HH  RN      x SNF/Inpatient Rehab/LTAC    Independent/assisted living    Hospice    Other:     CDMP Checked:   Yes Y     PROBLEM LIST Updated:  Yes Y       Signed:   ALIZA Hauser CNP  7/18/2025  8:53 AM

## 2025-07-18 NOTE — PROGRESS NOTES
Patient discharged [facility]. Discharge packet also transported via [facility] to receiving nurse.

## 2025-07-18 NOTE — CARE COORDINATION
Transition of Care: per attending the recs are for SNF/LTC memory care bed;ACCEPTED: [facility]- pts family 1st choice)  (for memory care bed)     Contact at [facility] is Olive- [phone]        NOTE: patient normally lives at home with his spouse (who is elderly and blind) and his stepdaughter; pt needs help with all adls; pt has  dementia     NOTE: ED CM opened up a new APS caseCM placed call to [phone] to make an additional report on 7/11/25  Report number is #[id number]. Case will be transferred to [facility] [phone] for continued follow-up.     NOTE: patients PCP ([facility] is requesting SNF placement on 7/14)     Transport Plan: in [facility]- scheduled for Friday 7/18/25 at 10am- pts spouse is in agreement     RUR: 9%     Main contacts are pts spouse- Beer Ren- [phone] and step daughter- Barbi (lives in the same home)      0900: this CM contacted Olive at [facility]- the bed is ready today; room #110; packet is ready with 3 prescriptions, dc summary and dc instructions; [facility] CM snet dc summary and dc instructions via careApartment List and made hard copies    1041: this CM called pts spouse and stepdaughter (Barbi) on the phone to inform them that the pt has discharged and will be at [facility]- room #110 by about 1100; they verbalized understanding;        1048: this CM called APS in [place] [phone] left VM (for worker- Mary) with message about pt discharge to SNF today;       Transition of Care Plan to SNF/Rehab    Communication to Patient/Family:  Met with patient and family and they are agreeable to the transition plan. The Plan for Transition of Care is related to the following treatment goals: SNF/LTC    The Patient and/or patient representative- pts spouse- Bere Ren was provided with a choice of provider and agrees  with the discharge plan.      Yes [x] No []    A Freedom of choice list was provided with basic dialogue that supports the patient's

## 2025-07-18 NOTE — PROGRESS NOTES
Report called to Mecca BARTON   at [facility] on Oleg Ren JR discharging to room 110. Report included SBAR opportunity for questions and clarification provided.

## 2025-07-25 ENCOUNTER — APPOINTMENT (OUTPATIENT)
Facility: HOSPITAL | Age: 80
End: 2025-07-25
Payer: MEDICARE

## 2025-07-25 ENCOUNTER — HOSPITAL ENCOUNTER (EMERGENCY)
Facility: HOSPITAL | Age: 80
Discharge: HOME | End: 2025-07-25
Attending: EMERGENCY MEDICINE
Payer: MEDICARE

## 2025-07-25 VITALS
SYSTOLIC BLOOD PRESSURE: 133 MMHG | TEMPERATURE: 98.3 F | HEIGHT: 64 IN | OXYGEN SATURATION: 98 % | WEIGHT: 143.08 LBS | BODY MASS INDEX: 24.43 KG/M2 | HEART RATE: 55 BPM | RESPIRATION RATE: 17 BRPM | DIASTOLIC BLOOD PRESSURE: 44 MMHG

## 2025-07-25 DIAGNOSIS — R14.0 GASEOUS ABDOMINAL DISTENTION: ICD-10-CM

## 2025-07-25 DIAGNOSIS — S00.83XA TRAUMATIC HEMATOMA OF FOREHEAD, INITIAL ENCOUNTER: Primary | ICD-10-CM

## 2025-07-25 DIAGNOSIS — Z91.81 AT RISK FOR FALLS: ICD-10-CM

## 2025-07-25 LAB
ALBUMIN SERPL-MCNC: 4 G/DL (ref 3.5–5)
ALBUMIN/GLOB SERPL: 1.2 (ref 1.1–2.2)
ALP SERPL-CCNC: 80 U/L (ref 45–117)
ALT SERPL-CCNC: 28 U/L (ref 12–78)
ANION GAP SERPL CALC-SCNC: 4 MMOL/L (ref 2–12)
AST SERPL-CCNC: 46 U/L (ref 15–37)
BASOPHILS # BLD: 0.04 K/UL (ref 0–0.1)
BASOPHILS NFR BLD: 0.4 % (ref 0–1)
BILIRUB SERPL-MCNC: 0.9 MG/DL (ref 0.2–1)
BUN SERPL-MCNC: 30 MG/DL (ref 6–20)
BUN/CREAT SERPL: 22 (ref 12–20)
CALCIUM SERPL-MCNC: 9.2 MG/DL (ref 8.5–10.1)
CHLORIDE SERPL-SCNC: 108 MMOL/L (ref 97–108)
CO2 SERPL-SCNC: 27 MMOL/L (ref 21–32)
COMMENT:: NORMAL
CREAT SERPL-MCNC: 1.35 MG/DL (ref 0.7–1.3)
DIFFERENTIAL METHOD BLD: NORMAL
EOSINOPHIL # BLD: 0.06 K/UL (ref 0–0.4)
EOSINOPHIL NFR BLD: 0.6 % (ref 0–7)
ERYTHROCYTE [DISTWIDTH] IN BLOOD BY AUTOMATED COUNT: 13.3 % (ref 11.5–14.5)
GLOBULIN SER CALC-MCNC: 3.4 G/DL (ref 2–4)
GLUCOSE SERPL-MCNC: 89 MG/DL (ref 65–100)
HCT VFR BLD AUTO: 40.3 % (ref 36.6–50.3)
HGB BLD-MCNC: 12.7 G/DL (ref 12.1–17)
IMM GRANULOCYTES # BLD AUTO: 0.04 K/UL (ref 0–0.04)
IMM GRANULOCYTES NFR BLD AUTO: 0.4 % (ref 0–0.5)
LIPASE SERPL-CCNC: 37 U/L (ref 13–75)
LYMPHOCYTES # BLD: 1.67 K/UL (ref 0.8–3.5)
LYMPHOCYTES NFR BLD: 17.1 % (ref 12–49)
MCH RBC QN AUTO: 28.3 PG (ref 26–34)
MCHC RBC AUTO-ENTMCNC: 31.5 G/DL (ref 30–36.5)
MCV RBC AUTO: 89.8 FL (ref 80–99)
MONOCYTES # BLD: 0.83 K/UL (ref 0–1)
MONOCYTES NFR BLD: 8.5 % (ref 5–13)
NEUTS SEG # BLD: 7.11 K/UL (ref 1.8–8)
NEUTS SEG NFR BLD: 73 % (ref 32–75)
NRBC # BLD: 0 K/UL (ref 0–0.01)
NRBC BLD-RTO: 0 PER 100 WBC
PLATELET # BLD AUTO: 206 K/UL (ref 150–400)
PMV BLD AUTO: 10.2 FL (ref 8.9–12.9)
POTASSIUM SERPL-SCNC: 3.6 MMOL/L (ref 3.5–5.1)
PROT SERPL-MCNC: 7.4 G/DL (ref 6.4–8.2)
RBC # BLD AUTO: 4.49 M/UL (ref 4.1–5.7)
SODIUM SERPL-SCNC: 139 MMOL/L (ref 136–145)
SPECIMEN HOLD: NORMAL
WBC # BLD AUTO: 9.8 K/UL (ref 4.1–11.1)

## 2025-07-25 PROCEDURE — 6360000004 HC RX CONTRAST MEDICATION: Performed by: EMERGENCY MEDICINE

## 2025-07-25 PROCEDURE — 72125 CT NECK SPINE W/O DYE: CPT

## 2025-07-25 PROCEDURE — 74177 CT ABD & PELVIS W/CONTRAST: CPT

## 2025-07-25 PROCEDURE — 83690 ASSAY OF LIPASE: CPT

## 2025-07-25 PROCEDURE — 80053 COMPREHEN METABOLIC PANEL: CPT

## 2025-07-25 PROCEDURE — 85025 COMPLETE CBC W/AUTO DIFF WBC: CPT

## 2025-07-25 PROCEDURE — 99285 EMERGENCY DEPT VISIT HI MDM: CPT

## 2025-07-25 PROCEDURE — 71045 X-RAY EXAM CHEST 1 VIEW: CPT

## 2025-07-25 PROCEDURE — 70450 CT HEAD/BRAIN W/O DYE: CPT

## 2025-07-25 RX ORDER — ACETAMINOPHEN 500 MG
1000 TABLET ORAL EVERY 6 HOURS PRN
Qty: 40 TABLET | Refills: 0 | Status: SHIPPED | OUTPATIENT
Start: 2025-07-25

## 2025-07-25 RX ORDER — ALUMINUM HYDROXIDE, MAGNESIUM HYDROXIDE, SIMETHICONE 400; 400; 40 MG/10ML; MG/10ML; MG/10ML
30 SUSPENSION ORAL EVERY 8 HOURS PRN
Qty: 600 ML | Refills: 0 | Status: SHIPPED | OUTPATIENT
Start: 2025-07-25

## 2025-07-25 RX ORDER — IOPAMIDOL 755 MG/ML
100 INJECTION, SOLUTION INTRAVASCULAR
Status: COMPLETED | OUTPATIENT
Start: 2025-07-25 | End: 2025-07-25

## 2025-07-25 RX ADMIN — IOPAMIDOL 100 ML: 755 INJECTION, SOLUTION INTRAVENOUS at 03:38

## 2025-07-25 ASSESSMENT — LIFESTYLE VARIABLES
HOW OFTEN DO YOU HAVE A DRINK CONTAINING ALCOHOL: PATIENT DECLINED
HOW MANY STANDARD DRINKS CONTAINING ALCOHOL DO YOU HAVE ON A TYPICAL DAY: PATIENT DECLINED

## 2025-07-25 ASSESSMENT — PAIN SCALES - GENERAL: PAINLEVEL_OUTOF10: 0

## 2025-07-25 NOTE — ED NOTES
Received from previous shift sitting on bed, noted bumps with bruises on right side of forehead, and skin tear on right hand, no active bleeding noted. Pt is waiting for [facility] bringing him back to his nursing home. No monitor attached, changed beddings and incontinent brief as pt is soak with urine. No skin breakdown on buttocks. Vital signs taken as follows manually. BP- 135/69, HR- 65, RR-17, SPO2- 100%, Temp- 97.9 axillary.

## 2025-07-25 NOTE — ED TRIAGE NOTES
Patient from [facility] for multiple unwitnessed fall patient denies any pain, he is AxO x 1 to person per facility that is normal baseline mental status has a large deformity to right side of forehead. No thinners per EMS

## 2025-07-25 NOTE — ED PROVIDER NOTES
and no orthostatic measurements provided.  - Medication side effect: Considered as a cause of falls in elderly; less likely as no new medications or changes documented.    DISPOSITION  Discharge: SNF    DIAGNOSIS  Primary Diagnosis: Multiple unwitnessed falls, forehead hematoma    ED Triage Vitals   BP Systolic BP Percentile Diastolic BP Percentile Temp Temp Source Pulse Respirations SpO2   07/25/25 0148 -- -- 07/25/25 0148 07/25/25 0636 07/25/25 0148 07/25/25 0148 07/25/25 0148   (!) 162/129   98.2 °F (36.8 °C) Oral 77 20 96 %      Height Weight - Scale         07/25/25 0200 07/25/25 0200         1.626 m (5' 4\") 64.9 kg (143 lb 1.3 oz)             CURRENT MEDICATIONS       Discharge Medication List as of 7/25/2025  5:55 AM        CONTINUE these medications which have NOT CHANGED    Details   losartan (COZAAR) 25 MG tablet Take 1 tablet by mouth daily, Disp-30 tablet, R-3Print      OLANZapine (ZYPREXA) 5 MG tablet Take 1 tablet by mouth nightly, Disp-30 tablet, R-3Print      pantoprazole (PROTONIX) 40 MG tablet Take 1 tablet by mouth every morning (before breakfast), Disp-30 tablet, R-3Print      acyclovir (ZOVIRAX) 800 MG tablet Take 800 mg by mouth 2 times dailyHistorical Med      atorvastatin (LIPITOR) 40 MG tablet Take by mouth dailyHistorical Med             REASSESSMENT     Vitals:    07/25/25 0148 07/25/25 0200 07/25/25 0215 07/25/25 0636   BP: (!) 162/129 (!) 141/78 (!) 184/68 (!) 133/44   Pulse: 77 57 63 55   Resp: 20 20  17   Temp: 98.2 °F (36.8 °C)   98.3 °F (36.8 °C)   TempSrc:    Oral   SpO2: 96% 98%  98%   Weight:  64.9 kg (143 lb 1.3 oz)     Height:  1.626 m (5' 4\")            EKG: All EKG's are interpreted by the Emergency Department Physician who either signs or Co-signs this chart in the absence of a cardiologist.      RADIOLOGY:   Non-plain film images such as CT, Ultrasound and MRI are read by the radiologist. Plain radiographic images are visualized and preliminarily interpreted by the emergency 
Universal Safety Interventions

## 2025-07-25 NOTE — ED NOTES
Bedside and Verbal shift change report given to ORALIA Saxena (oncoming nurse) by ORALIA Marie (offgoing nurse). Report included the following information Nurse Handoff Report, ED Encounter Summary, ED SBAR, MAR, Recent Results, and Neuro Assessment.